# Patient Record
Sex: MALE | Race: WHITE | NOT HISPANIC OR LATINO | Employment: OTHER | URBAN - METROPOLITAN AREA
[De-identification: names, ages, dates, MRNs, and addresses within clinical notes are randomized per-mention and may not be internally consistent; named-entity substitution may affect disease eponyms.]

---

## 2023-04-24 LAB
ALBUMIN (MG/L) IN URINE: 32.6 MG/L
ALBUMIN/CREATININE (UG/MG) IN URINE: 106.2 UG/MG CRT (ref 0–30)
CREATININE (MG/DL) IN URINE: 30.7 MG/DL (ref 20–370)

## 2023-06-01 ENCOUNTER — LAB (OUTPATIENT)
Dept: LAB | Facility: LAB | Age: 73
End: 2023-06-01
Payer: MEDICARE

## 2023-06-01 ENCOUNTER — OFFICE VISIT (OUTPATIENT)
Dept: PRIMARY CARE | Facility: CLINIC | Age: 73
End: 2023-06-01
Payer: MEDICARE

## 2023-06-01 VITALS
SYSTOLIC BLOOD PRESSURE: 130 MMHG | WEIGHT: 201.6 LBS | DIASTOLIC BLOOD PRESSURE: 81 MMHG | BODY MASS INDEX: 27.3 KG/M2 | HEIGHT: 72 IN | HEART RATE: 67 BPM | OXYGEN SATURATION: 97 % | RESPIRATION RATE: 16 BRPM

## 2023-06-01 DIAGNOSIS — R31.0 GROSS HEMATURIA: Primary | ICD-10-CM

## 2023-06-01 DIAGNOSIS — R31.0 GROSS HEMATURIA: ICD-10-CM

## 2023-06-01 LAB
ANION GAP IN SER/PLAS: 14 MMOL/L (ref 10–20)
CALCIUM (MG/DL) IN SER/PLAS: 9.4 MG/DL (ref 8.6–10.6)
CARBON DIOXIDE, TOTAL (MMOL/L) IN SER/PLAS: 28 MMOL/L (ref 21–32)
CHLORIDE (MMOL/L) IN SER/PLAS: 100 MMOL/L (ref 98–107)
CREATININE (MG/DL) IN SER/PLAS: 0.78 MG/DL (ref 0.5–1.3)
GFR MALE: >90 ML/MIN/1.73M2
GLUCOSE (MG/DL) IN SER/PLAS: 127 MG/DL (ref 74–99)
POC APPEARANCE, URINE: ABNORMAL
POC BILIRUBIN, URINE: ABNORMAL
POC BLOOD, URINE: ABNORMAL
POC COLOR, URINE: ABNORMAL
POC GLUCOSE, URINE: ABNORMAL MG/DL
POC KETONES, URINE: NEGATIVE MG/DL
POC LEUKOCYTES, URINE: NEGATIVE
POC NITRITE,URINE: NEGATIVE
POC PH, URINE: 6.5 PH
POC PROTEIN, URINE: ABNORMAL MG/DL
POC SPECIFIC GRAVITY, URINE: 1.02
POC UROBILINOGEN, URINE: 0.2 EU/DL
POTASSIUM (MMOL/L) IN SER/PLAS: 4.2 MMOL/L (ref 3.5–5.3)
SODIUM (MMOL/L) IN SER/PLAS: 138 MMOL/L (ref 136–145)
URATE (MG/DL) IN SER/PLAS: 4.6 MG/DL (ref 4–7.5)
UREA NITROGEN (MG/DL) IN SER/PLAS: 13 MG/DL (ref 6–23)

## 2023-06-01 PROCEDURE — 1159F MED LIST DOCD IN RCRD: CPT | Performed by: FAMILY MEDICINE

## 2023-06-01 PROCEDURE — 3079F DIAST BP 80-89 MM HG: CPT | Performed by: FAMILY MEDICINE

## 2023-06-01 PROCEDURE — 99213 OFFICE O/P EST LOW 20 MIN: CPT | Performed by: FAMILY MEDICINE

## 2023-06-01 PROCEDURE — 84550 ASSAY OF BLOOD/URIC ACID: CPT

## 2023-06-01 PROCEDURE — 3044F HG A1C LEVEL LT 7.0%: CPT | Performed by: FAMILY MEDICINE

## 2023-06-01 PROCEDURE — 1160F RVW MEDS BY RX/DR IN RCRD: CPT | Performed by: FAMILY MEDICINE

## 2023-06-01 PROCEDURE — 3075F SYST BP GE 130 - 139MM HG: CPT | Performed by: FAMILY MEDICINE

## 2023-06-01 PROCEDURE — 1036F TOBACCO NON-USER: CPT | Performed by: FAMILY MEDICINE

## 2023-06-01 PROCEDURE — 80048 BASIC METABOLIC PNL TOTAL CA: CPT

## 2023-06-01 PROCEDURE — 1157F ADVNC CARE PLAN IN RCRD: CPT | Performed by: FAMILY MEDICINE

## 2023-06-01 PROCEDURE — 81002 URINALYSIS NONAUTO W/O SCOPE: CPT | Performed by: FAMILY MEDICINE

## 2023-06-01 PROCEDURE — 87086 URINE CULTURE/COLONY COUNT: CPT

## 2023-06-01 PROCEDURE — 36415 COLL VENOUS BLD VENIPUNCTURE: CPT

## 2023-06-01 NOTE — PATIENT INSTRUCTIONS
Urine culture sent.  Nonfasting labs.  CT urogram.  Recommend adequate hydration.  Urology referral.    F/U 1 month as previously advised: Med refills.

## 2023-06-01 NOTE — PROGRESS NOTES
"Subjective   Patient ID: José Miguel Lua is a 72 y.o. male who presents for Blood in Urine.    HPI  Reports hematuria (red/maroon colored urine) when he got up this morning,  slightly lighter when he urinated a few hours later.  Noticed a \"little dark alma\" in the toilet twice over the past week.  Has urinary frequency and occ urgency at baseline due to Jardiance, but no increase today.  No dysuria, nausea, vomiting, fever.    +Family history of kidney stones (brother) but no personal history.  +Remote history of tobacco use (quit 1983).    Review of Systems   All other systems reviewed and are negative.    Objective   /81   Pulse 67   Resp 16   Ht 1.829 m (6')   Wt 91.4 kg (201 lb 9.6 oz)   SpO2 97%   BMI 27.34 kg/m²     Physical Exam  Constitutional:       General: He is not in acute distress.     Appearance: Normal appearance.   Abdominal:      Tenderness: There is no abdominal tenderness. There is no right CVA tenderness or left CVA tenderness.   Genitourinary:     Comments: Note: Urine is red w/solid strands and sediment.  Skin:     Coloration: Skin is not jaundiced or pale.   Neurological:      Mental Status: He is oriented to person, place, and time.   Psychiatric:         Mood and Affect: Mood normal.         Behavior: Behavior normal.     Assessment/Plan   Diagnoses and all orders for this visit:  Gross hematuria  -     POCT UA (nonautomated) manually resulted  -     Basic Metabolic Panel; Future  -     Uric Acid; Future  -     Urine Culture; Future  -     CT urography w 3D volume rendered imaging; Future  -     Referral to Urology; Future    Urine culture sent.  Nonfasting labs.  CT urogram.  Recommend adequate hydration.  Urology referral.    F/U 1 month as previously advised: Med refills.  "

## 2023-06-02 PROBLEM — I10 ESSENTIAL HYPERTENSION: Status: ACTIVE | Noted: 2019-11-11

## 2023-06-02 PROBLEM — C73 CANCER OF THYROID (MULTI): Status: ACTIVE | Noted: 2023-06-02

## 2023-06-02 PROBLEM — D50.9 IRON DEFICIENCY ANEMIA: Status: ACTIVE | Noted: 2023-06-02

## 2023-06-02 PROBLEM — E78.5 TYPE 2 DIABETES MELLITUS WITH HYPERLIPIDEMIA (MULTI): Status: ACTIVE | Noted: 2019-11-11

## 2023-06-02 PROBLEM — I25.10 ARTERIOSCLEROTIC CORONARY ARTERY DISEASE: Status: ACTIVE | Noted: 2023-06-02

## 2023-06-02 PROBLEM — E11.69 TYPE 2 DIABETES MELLITUS WITH HYPERLIPIDEMIA (MULTI): Status: ACTIVE | Noted: 2019-11-11

## 2023-06-02 PROBLEM — R80.9 MICROALBUMINURIA DUE TO TYPE 2 DIABETES MELLITUS (MULTI): Status: ACTIVE | Noted: 2023-06-02

## 2023-06-02 PROBLEM — E03.9 HYPOTHYROIDISM: Status: ACTIVE | Noted: 2023-06-02

## 2023-06-02 PROBLEM — E11.29 MICROALBUMINURIA DUE TO TYPE 2 DIABETES MELLITUS (MULTI): Status: ACTIVE | Noted: 2023-06-02

## 2023-06-02 PROBLEM — Z95.1 S/P CABG X 3: Status: ACTIVE | Noted: 2023-06-02

## 2023-06-02 PROBLEM — E83.51: Status: ACTIVE | Noted: 2023-06-02

## 2023-06-02 LAB — URINE CULTURE: NORMAL

## 2023-06-02 RX ORDER — SITAGLIPTIN 100 MG/1
1 TABLET, FILM COATED ORAL DAILY
COMMUNITY
Start: 2018-08-20 | End: 2023-07-09 | Stop reason: SDUPTHER

## 2023-06-02 RX ORDER — EMPAGLIFLOZIN 25 MG/1
1 TABLET, FILM COATED ORAL DAILY
COMMUNITY
Start: 2015-10-09 | End: 2023-07-09 | Stop reason: SDUPTHER

## 2023-06-02 RX ORDER — ASPIRIN 81 MG/1
1 TABLET ORAL DAILY
COMMUNITY
Start: 2015-01-21 | End: 2023-07-03 | Stop reason: SDUPTHER

## 2023-06-02 RX ORDER — LEVOTHYROXINE SODIUM 150 UG/1
1 TABLET ORAL DAILY
COMMUNITY
Start: 2013-07-16

## 2023-06-02 RX ORDER — FERROUS SULFATE 325(65) MG
1 TABLET ORAL DAILY
COMMUNITY
Start: 2020-12-20 | End: 2023-07-09 | Stop reason: SDUPTHER

## 2023-06-02 RX ORDER — ATORVASTATIN CALCIUM 80 MG/1
1 TABLET, FILM COATED ORAL DAILY
COMMUNITY
Start: 2019-11-14 | End: 2023-11-06

## 2023-06-02 RX ORDER — METFORMIN HYDROCHLORIDE 1000 MG/1
1 TABLET ORAL 2 TIMES DAILY
COMMUNITY
Start: 2013-07-22 | End: 2023-07-09 | Stop reason: SDUPTHER

## 2023-07-03 PROBLEM — I25.10 ATHSCL HEART DISEASE OF NATIVE CORONARY ARTERY W/O ANG PCTRS: Status: ACTIVE | Noted: 2019-11-11

## 2023-07-03 PROBLEM — N40.1 BPH WITH OBSTRUCTION/LOWER URINARY TRACT SYMPTOMS: Status: ACTIVE | Noted: 2023-07-03

## 2023-07-03 PROBLEM — N13.8 BPH WITH OBSTRUCTION/LOWER URINARY TRACT SYMPTOMS: Status: ACTIVE | Noted: 2023-07-03

## 2023-07-03 PROBLEM — R80.9 PROTEINURIA: Status: ACTIVE | Noted: 2023-07-03

## 2023-07-03 PROBLEM — R31.0 GROSS HEMATURIA: Status: ACTIVE | Noted: 2023-07-03

## 2023-07-03 RX ORDER — FAMOTIDINE 10 MG/1
10 TABLET ORAL DAILY
COMMUNITY

## 2023-07-03 RX ORDER — IBUPROFEN 800 MG/1
800 TABLET ORAL EVERY 6 HOURS PRN
COMMUNITY
Start: 2023-03-31 | End: 2024-01-05 | Stop reason: ALTCHOICE

## 2023-07-03 RX ORDER — NITROGLYCERIN 0.4 MG/1
0.4 TABLET SUBLINGUAL EVERY 5 MIN PRN
COMMUNITY
Start: 2019-10-29

## 2023-07-03 RX ORDER — POTASSIUM CHLORIDE 1500 MG/1
20 TABLET, EXTENDED RELEASE ORAL DAILY
COMMUNITY
Start: 2019-11-14 | End: 2024-01-05 | Stop reason: WASHOUT

## 2023-07-03 RX ORDER — BLOOD-GLUCOSE METER
KIT MISCELLANEOUS
COMMUNITY
Start: 2015-07-28 | End: 2023-09-18 | Stop reason: SDUPTHER

## 2023-07-03 RX ORDER — TADALAFIL 5 MG/1
5 TABLET ORAL DAILY
COMMUNITY
Start: 2023-06-15 | End: 2024-06-04 | Stop reason: SDUPTHER

## 2023-07-03 RX ORDER — METOPROLOL TARTRATE 50 MG/1
50 TABLET ORAL 2 TIMES DAILY
COMMUNITY
Start: 2019-11-14

## 2023-07-03 RX ORDER — LANCETS 28 GAUGE
EACH MISCELLANEOUS
COMMUNITY
Start: 2023-01-15 | End: 2023-09-18 | Stop reason: SDUPTHER

## 2023-07-03 RX ORDER — ACETAMINOPHEN 500 MG
2 TABLET ORAL DAILY
COMMUNITY

## 2023-07-03 RX ORDER — NAPROXEN SODIUM 220 MG/1
81 TABLET, FILM COATED ORAL DAILY
COMMUNITY
Start: 2019-11-14

## 2023-07-03 RX ORDER — CICLOPIROX 80 MG/ML
SOLUTION TOPICAL NIGHTLY
COMMUNITY

## 2023-07-03 RX ORDER — MULTIVITAMIN
1 TABLET ORAL DAILY
COMMUNITY
Start: 2019-11-25

## 2023-07-03 RX ORDER — CETIRIZINE HYDROCHLORIDE 10 MG/1
10 TABLET ORAL DAILY
COMMUNITY
Start: 2020-09-05

## 2023-07-03 RX ORDER — AMOXICILLIN 250 MG
2 CAPSULE ORAL DAILY
COMMUNITY
Start: 2019-11-14 | End: 2024-01-05 | Stop reason: WASHOUT

## 2023-07-03 RX ORDER — HYDROCODONE BITARTRATE AND ACETAMINOPHEN 5; 325 MG/1; MG/1
1 TABLET ORAL EVERY 6 HOURS PRN
COMMUNITY
Start: 2023-03-31 | End: 2024-01-05 | Stop reason: ALTCHOICE

## 2023-07-05 ENCOUNTER — LAB (OUTPATIENT)
Dept: LAB | Facility: LAB | Age: 73
End: 2023-07-05
Payer: MEDICARE

## 2023-07-05 ENCOUNTER — OFFICE VISIT (OUTPATIENT)
Dept: PRIMARY CARE | Facility: CLINIC | Age: 73
End: 2023-07-05
Payer: MEDICARE

## 2023-07-05 VITALS
SYSTOLIC BLOOD PRESSURE: 126 MMHG | WEIGHT: 206 LBS | HEART RATE: 65 BPM | OXYGEN SATURATION: 97 % | DIASTOLIC BLOOD PRESSURE: 76 MMHG | RESPIRATION RATE: 16 BRPM | HEIGHT: 72 IN | BODY MASS INDEX: 27.9 KG/M2

## 2023-07-05 DIAGNOSIS — E11.29 TYPE 2 DIABETES MELLITUS WITH MICROALBUMINURIA, WITHOUT LONG-TERM CURRENT USE OF INSULIN (MULTI): Primary | ICD-10-CM

## 2023-07-05 DIAGNOSIS — E11.29 TYPE 2 DIABETES MELLITUS WITH MICROALBUMINURIA, WITHOUT LONG-TERM CURRENT USE OF INSULIN (MULTI): ICD-10-CM

## 2023-07-05 DIAGNOSIS — R80.9 TYPE 2 DIABETES MELLITUS WITH MICROALBUMINURIA, WITHOUT LONG-TERM CURRENT USE OF INSULIN (MULTI): ICD-10-CM

## 2023-07-05 DIAGNOSIS — R80.9 TYPE 2 DIABETES MELLITUS WITH MICROALBUMINURIA, WITHOUT LONG-TERM CURRENT USE OF INSULIN (MULTI): Primary | ICD-10-CM

## 2023-07-05 DIAGNOSIS — D50.9 IRON DEFICIENCY ANEMIA, UNSPECIFIED IRON DEFICIENCY ANEMIA TYPE: ICD-10-CM

## 2023-07-05 PROBLEM — E78.5 HYPERLIPIDEMIA: Status: ACTIVE | Noted: 2023-07-05

## 2023-07-05 LAB
ERYTHROCYTE DISTRIBUTION WIDTH (RATIO) BY AUTOMATED COUNT: 14 % (ref 11.5–14.5)
ERYTHROCYTE MEAN CORPUSCULAR HEMOGLOBIN CONCENTRATION (G/DL) BY AUTOMATED: 33 G/DL (ref 32–36)
ERYTHROCYTE MEAN CORPUSCULAR VOLUME (FL) BY AUTOMATED COUNT: 100 FL (ref 80–100)
ERYTHROCYTES (10*6/UL) IN BLOOD BY AUTOMATED COUNT: 4.41 X10E12/L (ref 4.5–5.9)
ESTIMATED AVERAGE GLUCOSE FOR HBA1C: 148 MG/DL
HEMATOCRIT (%) IN BLOOD BY AUTOMATED COUNT: 44.2 % (ref 41–52)
HEMOGLOBIN (G/DL) IN BLOOD: 14.6 G/DL (ref 13.5–17.5)
HEMOGLOBIN A1C/HEMOGLOBIN TOTAL IN BLOOD: 6.8 %
LEUKOCYTES (10*3/UL) IN BLOOD BY AUTOMATED COUNT: 7.6 X10E9/L (ref 4.4–11.3)
NRBC (PER 100 WBCS) BY AUTOMATED COUNT: 0 /100 WBC (ref 0–0)
PLATELETS (10*3/UL) IN BLOOD AUTOMATED COUNT: 199 X10E9/L (ref 150–450)

## 2023-07-05 PROCEDURE — 3078F DIAST BP <80 MM HG: CPT | Performed by: FAMILY MEDICINE

## 2023-07-05 PROCEDURE — 1157F ADVNC CARE PLAN IN RCRD: CPT | Performed by: FAMILY MEDICINE

## 2023-07-05 PROCEDURE — 1160F RVW MEDS BY RX/DR IN RCRD: CPT | Performed by: FAMILY MEDICINE

## 2023-07-05 PROCEDURE — 83036 HEMOGLOBIN GLYCOSYLATED A1C: CPT

## 2023-07-05 PROCEDURE — 1159F MED LIST DOCD IN RCRD: CPT | Performed by: FAMILY MEDICINE

## 2023-07-05 PROCEDURE — 85027 COMPLETE CBC AUTOMATED: CPT

## 2023-07-05 PROCEDURE — 99214 OFFICE O/P EST MOD 30 MIN: CPT | Performed by: FAMILY MEDICINE

## 2023-07-05 PROCEDURE — 36415 COLL VENOUS BLD VENIPUNCTURE: CPT

## 2023-07-05 PROCEDURE — 2028F FOOT EXAM PERFORMED: CPT | Performed by: FAMILY MEDICINE

## 2023-07-05 PROCEDURE — 1036F TOBACCO NON-USER: CPT | Performed by: FAMILY MEDICINE

## 2023-07-05 PROCEDURE — 3044F HG A1C LEVEL LT 7.0%: CPT | Performed by: FAMILY MEDICINE

## 2023-07-05 PROCEDURE — 3074F SYST BP LT 130 MM HG: CPT | Performed by: FAMILY MEDICINE

## 2023-07-05 NOTE — PROGRESS NOTES
Subjective   Patient ID: José Miguel Lua is a 72 y.o. male who presents for Med Refill.    HPI  H/O Anemia, DM.  Condition(s) stable.  Taking med(s) as directed.  Requests refills.    Review of Systems  No acute complaints.     Objective   /76   Pulse 65   Resp 16   Ht 1.829 m (6')   Wt 93.4 kg (206 lb)   SpO2 97%   BMI 27.94 kg/m²     Physical Exam  Constitutional:       General: He is not in acute distress.     Appearance: Normal appearance.   Eyes:      Conjunctiva/sclera: Conjunctivae normal.   Cardiovascular:      Rate and Rhythm: Normal rate and regular rhythm.      Heart sounds: Normal heart sounds. No murmur heard.     No friction rub. No gallop.   Pulmonary:      Effort: Pulmonary effort is normal.      Breath sounds: Normal breath sounds. No wheezing, rhonchi or rales.   Neurological:      Mental Status: He is oriented to person, place, and time.   Psychiatric:         Mood and Affect: Mood normal.         Behavior: Behavior normal.     Assessment/Plan   Diagnoses and all orders for this visit:  Type 2 diabetes mellitus with microalbuminuria, without long-term current use of insulin (CMS/[facility])  -     Hemoglobin A1C; Future  Iron deficiency anemia, unspecified iron deficiency anemia type  -     CBC; Future    Nonfasting labs.  Will refill meds after reviewing lab results.    F/U 6 months: Annual wellness visit.

## 2023-07-05 NOTE — PATIENT INSTRUCTIONS
Nonfasting labs.  Will refill meds after reviewing lab results.    F/U 6 months: Annual wellness visit.

## 2023-07-09 DIAGNOSIS — E11.9 TYPE 2 DIABETES MELLITUS WITHOUT COMPLICATION, WITHOUT LONG-TERM CURRENT USE OF INSULIN (MULTI): ICD-10-CM

## 2023-07-09 DIAGNOSIS — D50.9 IRON DEFICIENCY ANEMIA, UNSPECIFIED IRON DEFICIENCY ANEMIA TYPE: Primary | ICD-10-CM

## 2023-07-09 RX ORDER — EMPAGLIFLOZIN 25 MG/1
25 TABLET, FILM COATED ORAL DAILY
Qty: 90 TABLET | Refills: 1 | Status: SHIPPED | OUTPATIENT
Start: 2023-07-09 | End: 2024-01-06 | Stop reason: SDUPTHER

## 2023-07-09 RX ORDER — FERROUS SULFATE 325(65) MG
1 TABLET ORAL DAILY
Qty: 90 TABLET | Refills: 1 | Status: SHIPPED | OUTPATIENT
Start: 2023-07-09 | End: 2024-01-06 | Stop reason: SDUPTHER

## 2023-07-09 RX ORDER — METFORMIN HYDROCHLORIDE 1000 MG/1
1000 TABLET ORAL 2 TIMES DAILY
Qty: 180 TABLET | Refills: 1 | Status: SHIPPED | OUTPATIENT
Start: 2023-07-09 | End: 2024-01-06 | Stop reason: SDUPTHER

## 2023-09-01 LAB
THYROTROPIN (MIU/L) IN SER/PLAS BY DETECTION LIMIT <= 0.05 MIU/L: 3.32 MIU/L (ref 0.44–3.98)
THYROXINE (T4) FREE (NG/DL) IN SER/PLAS: 1.47 NG/DL (ref 0.78–1.48)

## 2023-09-07 LAB
THYROGLOBULIN AB (IU/ML) IN SER/PLAS: <0.9 IU/ML (ref 0–4)
THYROGLOBULIN LC-MS/MS: ABNORMAL NG/ML (ref 1.3–31.8)
THYROGLOBULIN: 0.1 NG/ML (ref 1.3–31.8)

## 2023-09-18 DIAGNOSIS — E78.5 TYPE 2 DIABETES MELLITUS WITH HYPERLIPIDEMIA (MULTI): Primary | ICD-10-CM

## 2023-09-18 DIAGNOSIS — E11.69 TYPE 2 DIABETES MELLITUS WITH HYPERLIPIDEMIA (MULTI): Primary | ICD-10-CM

## 2023-09-18 RX ORDER — BLOOD-GLUCOSE METER
KIT MISCELLANEOUS
Qty: 100 STRIP | Refills: 3 | Status: SHIPPED | OUTPATIENT
Start: 2023-09-18

## 2023-09-18 RX ORDER — LANCETS 28 GAUGE
EACH MISCELLANEOUS
Qty: 100 EACH | Refills: 3 | Status: SHIPPED | OUTPATIENT
Start: 2023-09-18

## 2023-09-21 RX ORDER — CIPROFLOXACIN 500 MG/1
150 TABLET ORAL DAILY
COMMUNITY
Start: 2023-06-15 | End: 2023-06-16

## 2023-10-16 DIAGNOSIS — I25.10 ARTERIOSCLEROTIC CORONARY ARTERY DISEASE: Primary | ICD-10-CM

## 2023-10-27 ENCOUNTER — OFFICE VISIT (OUTPATIENT)
Dept: NEPHROLOGY | Facility: CLINIC | Age: 73
End: 2023-10-27
Payer: MEDICARE

## 2023-10-27 VITALS
DIASTOLIC BLOOD PRESSURE: 80 MMHG | OXYGEN SATURATION: 97 % | WEIGHT: 202.82 LBS | HEART RATE: 68 BPM | BODY MASS INDEX: 27.47 KG/M2 | SYSTOLIC BLOOD PRESSURE: 143 MMHG | HEIGHT: 72 IN

## 2023-10-27 DIAGNOSIS — E78.5 TYPE 2 DIABETES MELLITUS WITH HYPERLIPIDEMIA (MULTI): ICD-10-CM

## 2023-10-27 DIAGNOSIS — R80.9 PROTEINURIA, UNSPECIFIED TYPE: Primary | ICD-10-CM

## 2023-10-27 DIAGNOSIS — E11.69 TYPE 2 DIABETES MELLITUS WITH HYPERLIPIDEMIA (MULTI): ICD-10-CM

## 2023-10-27 PROCEDURE — 3075F SYST BP GE 130 - 139MM HG: CPT | Performed by: NURSE PRACTITIONER

## 2023-10-27 PROCEDURE — 2028F FOOT EXAM PERFORMED: CPT | Performed by: NURSE PRACTITIONER

## 2023-10-27 PROCEDURE — 3044F HG A1C LEVEL LT 7.0%: CPT | Performed by: NURSE PRACTITIONER

## 2023-10-27 PROCEDURE — 1159F MED LIST DOCD IN RCRD: CPT | Performed by: NURSE PRACTITIONER

## 2023-10-27 PROCEDURE — 3079F DIAST BP 80-89 MM HG: CPT | Performed by: NURSE PRACTITIONER

## 2023-10-27 PROCEDURE — 1160F RVW MEDS BY RX/DR IN RCRD: CPT | Performed by: NURSE PRACTITIONER

## 2023-10-27 PROCEDURE — 99213 OFFICE O/P EST LOW 20 MIN: CPT | Performed by: NURSE PRACTITIONER

## 2023-10-27 PROCEDURE — 3066F NEPHROPATHY DOC TX: CPT | Performed by: NURSE PRACTITIONER

## 2023-10-27 PROCEDURE — 1036F TOBACCO NON-USER: CPT | Performed by: NURSE PRACTITIONER

## 2023-10-27 PROCEDURE — 3061F NEG MICROALBUMINURIA REV: CPT | Performed by: NURSE PRACTITIONER

## 2023-10-27 PROCEDURE — 1126F AMNT PAIN NOTED NONE PRSNT: CPT | Performed by: NURSE PRACTITIONER

## 2023-10-27 ASSESSMENT — ENCOUNTER SYMPTOMS
DEPRESSION: 0
LOSS OF SENSATION IN FEET: 0
OCCASIONAL FEELINGS OF UNSTEADINESS: 0

## 2023-10-27 NOTE — PROGRESS NOTES
History Of Present Illness  José Miguel Lua is a 73 y.o. male with medical history significant for DMII, proteinuria, CAD s/p CABGx3, HLD, PEDRO, Vit D deficiency, thyroid cancer s/p total thyroidectomy, hypothyroidism, and COVID in December 2022 who presents for a 6-month fuv for proteinuria.      Past Medical History  As above.    Surgical History  He has a past surgical history that includes Total thyroidectomy (10/22/2013); Other surgical history (05/11/2018); and Other surgical history (12/29/2019).     Social History  He reports that he quit smoking about 40 years ago. His smoking use included cigarettes. He has a 7.00 pack-year smoking history. He has never used smokeless tobacco. No history on file for alcohol use and drug use.    Family History  Family History   Problem Relation Name Age of Onset    Nephrolithiasis Brother          Allergies  Lisinopril    Review of Systems   Constitutional: Negative.    HENT: Negative.     Respiratory: Negative.     Cardiovascular: Negative.    Gastrointestinal: Negative.    Endocrine: Negative.    Genitourinary: Negative.    Musculoskeletal: Negative.    Skin: Negative.    Neurological: Negative.    Hematological: Negative.    Psychiatric/Behavioral: Negative.          Physical Exam  Constitutional:       Appearance: Normal appearance.   HENT:      Head: Normocephalic and atraumatic.      Mouth/Throat:      Mouth: Mucous membranes are moist.   Cardiovascular:      Rate and Rhythm: Normal rate and regular rhythm.      Pulses: Normal pulses.      Heart sounds: Normal heart sounds.   Pulmonary:      Effort: Pulmonary effort is normal.      Breath sounds: Normal breath sounds.   Musculoskeletal:         General: Normal range of motion.   Skin:     General: Skin is warm and dry.   Neurological:      General: No focal deficit present.      Mental Status: He is alert and oriented to person, place, and time.   Psychiatric:         Mood and Affect: Mood normal.         Behavior:  Behavior normal.         Thought Content: Thought content normal.         Judgment: Judgment normal.          Last Recorded Vitals  Blood pressure 143/80, pulse 68, height 1.829 m (6'), weight 92 kg (202 lb 13.2 oz), SpO2 97 %.    Relevant Results  Recent Results (from the past 840 hour(s))   Albumin , Urine Random    Collection Time: 23  2:11 PM   Result Value Ref Range    Albumin, Urine Random 24.9 Not established mg/L    Creatinine, Urine Random 48.0 20.0 - 370.0 mg/dL    Albumin/Creatine Ratio 51.9 (H) <30.0 ug/mg Creat          Assessment/Plan   73-year-old male with medical history significant for DMII, proteinuria, CAD s/p CABGx3, HLD, PEDRO, Vit D deficiency, thyroid cancer s/p total thyroidectomy, hypothyroidism, and COVID in 2022 who presents for a 6-month fuv for microalbuminuria.     # Microalbuminuria: microalbuminuria since at least 2023 - alb/cr ratio 41.0 ug/mg on 23, and 98.7 ug/mg on 23. Serum creatinine level has been WNL. 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     # DMII: dx in . Most recent HgbA1c 6.8% on 23.     Plan:  - Repeat albumin urine spot.  - Defer ACEi at this time.  - Continue to f/u with PCP for optimal DMII management.  - Reviewed strategies for delaying the progression of microalbuminuria includin) Avoidance of NSAIDs including Aleve (Naprosyn), Motrin (Ibuprofen), Mobic (Meloxicam), Celebrex (Celecoxib) and aspirin as well as PPI acid blocking medications such as Prilosec (omeprazole), Protonix (pantoprazole), and Nexium (esomeprazole), as well as other nephrotoxic agents.   2) Avoidance of tobacco or alcohol use.   3) Adequate hydration daily.   4) Blood pressure target 130/80 mmHg.   5) Daily dietary sodium intake less than 2 grams per day.    6) Maintain healthy lifestyle. Healthy diet and regular exercises.   7) Maintain ideal weight.  - FUV: in 6 months or sooner if concerns arise.     Patient  verbalized understanding of above. He will not hesitate to contact Division of Nephrology at [phone] with concerns/questions.     I spent 20 minutes in the professional and overall care of this patient.      Anne-Marie Lew, APRN-CNP

## 2023-11-01 ENCOUNTER — LAB (OUTPATIENT)
Dept: LAB | Facility: LAB | Age: 73
End: 2023-11-01
Payer: MEDICARE

## 2023-11-01 DIAGNOSIS — R80.9 PROTEINURIA, UNSPECIFIED TYPE: ICD-10-CM

## 2023-11-01 PROCEDURE — 82043 UR ALBUMIN QUANTITATIVE: CPT

## 2023-11-01 PROCEDURE — 82570 ASSAY OF URINE CREATININE: CPT

## 2023-11-01 NOTE — PROGRESS NOTES
Chief Complaint:   1 yr follow up     History Of Present Illness:    73-year-old male with HTN, dyslipidemia, non-insulin-dependent DM, CAD with CABG x 3(LIMA-LAD, rad-PLV, DAGMAR-PDA) 11/2019     His coronary disease initially presented with accelerating angina and high risk exercise treadmill test. Extreme exhaustion with minimal activity.      interval events:      No chest pain, dyspnea, palpitations, presyncope or syncope  He continues to exercise by walking several days a week.  Says had some prostatic bleeding which has now resolved.    ECG 11/2023: NSR with HR 64 bpm, RBBB no significant change from prior 1 year ago  ECHO 11/2019: LVEF 65%,, mid and apical inferior wall and mid inferolateral segment  CARDIAC CATH 2019:  Coronary Lesion Summary:  Vessel       Stenosis   Vessel Segment  LAD        80% stenosis    proximal  LAD        90% stenosis proximal to mid  Circumflex 30% stenosis    proximal  RCA        99% stenosis    proximal  RPL        80% stenosis    proximal        Last Recorded Vitals:  Vitals:    11/06/23 1616   BP: 130/80   BP Location: Left arm   Pulse: 66   SpO2: 98%   Weight: 92.1 kg (203 lb)       Past Medical History:  He has a past medical history of Atherosclerotic heart disease of native coronary artery with unspecified angina pectoris (CMS/HCC) (06/23/2021), Atherosclerotic heart disease of native coronary artery with unspecified angina pectoris (CMS/HCC) (06/23/2021), Atherosclerotic heart disease of native coronary artery with unspecified angina pectoris (CMS/HCC) (06/23/2021), COVID-19 (12/31/2022), Encounter for follow-up examination after completed treatment for conditions other than malignant neoplasm (11/25/2019), Encounter for immunization (12/11/2020), Hypocalcemia (12/16/2019), Other conditions influencing health status (07/25/2019), Personal history of diseases of the blood and blood-forming organs and certain disorders involving the immune mechanism (12/11/2020), Personal  history of diseases of the skin and subcutaneous tissue (09/09/2020), Personal history of malignant neoplasm of thyroid (08/25/2017), Personal history of other drug therapy (09/30/2016), Personal history of other specified conditions (10/29/2019), Strain of unspecified muscle, fascia and tendon at shoulder and upper arm level, right arm, initial encounter (11/08/2017), and Toxic effect of venom of bees, accidental (unintentional), initial encounter (09/09/2020).    Past Surgical History:  He has a past surgical history that includes Total thyroidectomy (10/22/2013); Other surgical history (05/11/2018); and Other surgical history (12/29/2019).      Social History:  He reports that he quit smoking about 40 years ago. His smoking use included cigarettes. He has a 7.00 pack-year smoking history. He has never used smokeless tobacco. No history on file for alcohol use and drug use.    Family History:  Family History   Problem Relation Name Age of Onset    Nephrolithiasis Brother          Allergies:  Lisinopril    Outpatient Medications:  Current Outpatient Medications   Medication Instructions    aspirin 81 mg, oral, Daily    atorvastatin (Lipitor) 80 mg tablet 1 tablet, oral, Daily    blood sugar diagnostic (FreeStyle Lite Strips) strip TEST ONCE DAILY    calcium carbonate-vitamin D3 600 mg-20 mcg (800 unit) tablet 2 tablets, oral, Daily    cetirizine (ZYRTEC) 10 mg, oral, Daily    ciclopirox (Penlac) 8 % solution Topical, Nightly    famotidine (PEPCID) 10 mg, oral, Daily    ferrous sulfate 325 mg, oral, Daily    fish oil concentrate (Omega-3) 120-180 mg capsule oral, Daily    FreeStyle Lancets 28 gauge TEST ONCE DAILY    HYDROcodone-acetaminophen (Norco) 5-325 mg tablet 1 tablet, oral, Every 6 hours PRN    ibuprofen 800 mg, oral, Every 6 hours PRN    Jardiance 25 mg, oral, Daily    metFORMIN (GLUCOPHAGE) 1,000 mg, oral, 2 times daily    metoprolol tartrate (LOPRESSOR) 50 mg, oral, 2 times daily    multivitamin (Multiple  Vitamins) tablet 1 tablet, oral, Daily    nitroglycerin (NITROSTAT) 0.4 mg, sublingual, Every 5 min PRN    potassium chloride CR (K-Tab) 20 mEq ER tablet 20 mEq, oral, Daily    sennosides-docusate sodium (Nava-Colace) 8.6-50 mg tablet 2 tablets, oral, Daily    SITagliptin phosphate (JANUVIA) 100 mg, oral, Daily    Synthroid 150 mcg tablet 1 tablet, oral, Daily    tadalafil (CIALIS) 5 mg, oral, Daily       Physical Exam:  GENERAL: alert, cooperative, pleasant, in no acute distress  SKIN: warm, dry, no rash.  NECK: no JVD, no MIKE  CARDIAC: Regular rate and rhythm with no rubs, murmurs, or gallops  CHEST: Normal respiratory efforts, lungs clear to auscultation bilaterally.  ABDOMEN: soft, nontender, nondistended  EXTREMITIES: no edema  NEURO: Alert and oriented x 3.  Grossly normal.  Moves all 4 extremities.       Last Labs:  CBC -  Lab Results   Component Value Date    WBC 7.6 07/05/2023    HGB 14.6 07/05/2023    HCT 44.2 07/05/2023     07/05/2023     07/05/2023       CMP -  Lab Results   Component Value Date    CALCIUM 9.4 06/01/2023    PHOS 1.9 (L) 11/14/2019    PROT 6.9 09/05/2020    ALBUMIN 4.3 09/05/2020    AST 15 01/09/2023    ALT 15 01/09/2023    ALKPHOS 69 09/05/2020    BILITOT 0.6 09/05/2020       LIPID PANEL -   Lab Results   Component Value Date    CHOL 126 01/09/2023    TRIG 108 01/09/2023    HDL 34.4 (A) 01/09/2023    CHHDL 3.7 01/09/2023    LDLF 70 01/09/2023    VLDL 22 01/09/2023       RENAL FUNCTION PANEL -   Lab Results   Component Value Date    GLUCOSE 127 (H) 06/01/2023     06/01/2023    K 4.2 06/01/2023     06/01/2023    CO2 28 06/01/2023    ANIONGAP 14 06/01/2023    BUN 13 06/01/2023    CREATININE 0.78 06/01/2023    GFRMALE >90 06/01/2023    CALCIUM 9.4 06/01/2023    PHOS 1.9 (L) 11/14/2019    ALBUMIN 4.3 09/05/2020        Lab Results   Component Value Date    HGBA1C 6.8 (A) 07/05/2023       Assessment/Plan   73-year-old male with HTN, dyslipidemia, non-insulin-dependent  DM, CAD with CABG x 3(LIMA-LAD, rad-PLV, DAGMAR-PDA) 11/2019 who is doing well over all with no interval cardiac events in the past year. Continue aspirin and statin. lipids well controlled. He has been maintained off of metoprolol due to increased fatigue with this medication. Continues to do well with exercise that is moderate on a regular basis without symptoms. Plan continue current medications and follow-up in about 1 year          Neri Larios, DO

## 2023-11-02 LAB
CREAT UR-MCNC: 48 MG/DL (ref 20–370)
MICROALBUMIN UR-MCNC: 24.9 MG/L
MICROALBUMIN/CREAT UR: 51.9 UG/MG CREAT

## 2023-11-02 NOTE — TELEPHONE ENCOUNTER
Patient compliant with office visits. Upcoming appointment on 11/6/2023. Script cued and forwarded to Dr. Larios to send

## 2023-11-06 ENCOUNTER — OFFICE VISIT (OUTPATIENT)
Dept: CARDIOLOGY | Facility: CLINIC | Age: 73
End: 2023-11-06
Payer: MEDICARE

## 2023-11-06 VITALS
SYSTOLIC BLOOD PRESSURE: 130 MMHG | BODY MASS INDEX: 27.53 KG/M2 | WEIGHT: 203 LBS | HEART RATE: 66 BPM | DIASTOLIC BLOOD PRESSURE: 80 MMHG | OXYGEN SATURATION: 98 %

## 2023-11-06 DIAGNOSIS — I25.10 ARTERIOSCLEROTIC CORONARY ARTERY DISEASE: Primary | ICD-10-CM

## 2023-11-06 DIAGNOSIS — E78.2 MIXED HYPERLIPIDEMIA: ICD-10-CM

## 2023-11-06 DIAGNOSIS — Z95.1 S/P CABG X 3: ICD-10-CM

## 2023-11-06 PROCEDURE — 1159F MED LIST DOCD IN RCRD: CPT | Performed by: INTERNAL MEDICINE

## 2023-11-06 PROCEDURE — 2028F FOOT EXAM PERFORMED: CPT | Performed by: INTERNAL MEDICINE

## 2023-11-06 PROCEDURE — 99214 OFFICE O/P EST MOD 30 MIN: CPT | Mod: PO | Performed by: INTERNAL MEDICINE

## 2023-11-06 PROCEDURE — 3066F NEPHROPATHY DOC TX: CPT | Performed by: INTERNAL MEDICINE

## 2023-11-06 PROCEDURE — 3075F SYST BP GE 130 - 139MM HG: CPT | Performed by: INTERNAL MEDICINE

## 2023-11-06 PROCEDURE — 93005 ELECTROCARDIOGRAM TRACING: CPT | Mod: PO | Performed by: INTERNAL MEDICINE

## 2023-11-06 PROCEDURE — 99214 OFFICE O/P EST MOD 30 MIN: CPT | Performed by: INTERNAL MEDICINE

## 2023-11-06 PROCEDURE — 1160F RVW MEDS BY RX/DR IN RCRD: CPT | Performed by: INTERNAL MEDICINE

## 2023-11-06 PROCEDURE — 1126F AMNT PAIN NOTED NONE PRSNT: CPT | Performed by: INTERNAL MEDICINE

## 2023-11-06 PROCEDURE — 3044F HG A1C LEVEL LT 7.0%: CPT | Performed by: INTERNAL MEDICINE

## 2023-11-06 PROCEDURE — 3079F DIAST BP 80-89 MM HG: CPT | Performed by: INTERNAL MEDICINE

## 2023-11-06 PROCEDURE — 3061F NEG MICROALBUMINURIA REV: CPT | Performed by: INTERNAL MEDICINE

## 2023-11-06 PROCEDURE — 1036F TOBACCO NON-USER: CPT | Performed by: INTERNAL MEDICINE

## 2023-11-06 RX ORDER — ATORVASTATIN CALCIUM 80 MG/1
80 TABLET, FILM COATED ORAL NIGHTLY
Qty: 90 TABLET | Refills: 3 | Status: SHIPPED | OUTPATIENT
Start: 2023-11-06

## 2023-11-11 ASSESSMENT — ENCOUNTER SYMPTOMS
HEMATOLOGIC/LYMPHATIC NEGATIVE: 1
CONSTITUTIONAL NEGATIVE: 1
MUSCULOSKELETAL NEGATIVE: 1
ENDOCRINE NEGATIVE: 1
GASTROINTESTINAL NEGATIVE: 1
CARDIOVASCULAR NEGATIVE: 1
NEUROLOGICAL NEGATIVE: 1
RESPIRATORY NEGATIVE: 1
PSYCHIATRIC NEGATIVE: 1

## 2023-11-20 LAB
ATRIAL RATE: 64 BPM
P AXIS: 36 DEGREES
P OFFSET: 200 MS
P ONSET: 157 MS
PR INTERVAL: 124 MS
Q ONSET: 219 MS
QRS COUNT: 10 BEATS
QRS DURATION: 136 MS
QT INTERVAL: 420 MS
QTC CALCULATION(BAZETT): 433 MS
QTC FREDERICIA: 429 MS
R AXIS: 74 DEGREES
T AXIS: 27 DEGREES
T OFFSET: 429 MS
VENTRICULAR RATE: 64 BPM

## 2023-12-14 NOTE — PROGRESS NOTES
FUV    Last seen - 6/15/23     HISTORY OF PRESENT ILLNESS:   José Miguel Lua is a 73 y.o. male who is being seen today for FUV    Hx:  -gross hematuria  -CT urogram 6/9/23 unremarkable  -negative cystoscopy  -urinary urgency and NTF on daily dosing Cialis     PAST MEDICAL HISTORY:  Past Medical History:   Diagnosis Date    Atherosclerotic heart disease of native coronary artery with unspecified angina pectoris (CMS/HCC) 06/23/2021    Coronary artery disease involving native coronary artery of native heart with angina pectoris    Atherosclerotic heart disease of native coronary artery with unspecified angina pectoris (CMS/HCC) 06/23/2021    Coronary artery disease involving native coronary artery of native heart with angina pectoris    Atherosclerotic heart disease of native coronary artery with unspecified angina pectoris (CMS/HCC) 06/23/2021    Coronary artery disease involving native coronary artery of native heart with angina pectoris    COVID-19 12/31/2022    COVID-19    Encounter for follow-up examination after completed treatment for conditions other than malignant neoplasm 11/25/2019    Hospital discharge follow-up    Encounter for immunization 12/11/2020    Encounter for immunization    Hypocalcemia 12/16/2019    Hypocalcemia    Other conditions influencing health status 07/25/2019    History of cough    Personal history of diseases of the blood and blood-forming organs and certain disorders involving the immune mechanism 12/11/2020    History of anemia    Personal history of diseases of the skin and subcutaneous tissue 09/09/2020    History of cellulitis    Personal history of malignant neoplasm of thyroid 08/25/2017    History of malignant neoplasm of thyroid    Personal history of other drug therapy 09/30/2016    History of influenza vaccination    Personal history of other specified conditions 10/29/2019    History of exertional chest pain    Strain of unspecified muscle, fascia and tendon at shoulder  and upper arm level, right arm, initial encounter 11/08/2017    Right shoulder strain    Toxic effect of venom of bees, accidental (unintentional), initial encounter 09/09/2020    Bee sting       PAST SURGICAL HISTORY:  Past Surgical History:   Procedure Laterality Date    OTHER SURGICAL HISTORY  05/11/2018    Parathyroid Surgery    OTHER SURGICAL HISTORY  12/29/2019    Coronary artery bypass graft    TOTAL THYROIDECTOMY  10/22/2013    Thyroid Surgery Total Thyroidectomy        ALLERGIES:   Allergies   Allergen Reactions    Lisinopril Cough        MEDICATIONS:   Current Outpatient Medications   Medication Instructions    aspirin 81 mg, oral, Daily    atorvastatin (LIPITOR) 80 mg, oral, Nightly    blood sugar diagnostic (FreeStyle Lite Strips) strip TEST ONCE DAILY    calcium carbonate-vitamin D3 600 mg-20 mcg (800 unit) tablet 2 tablets, oral, Daily    cetirizine (ZYRTEC) 10 mg, oral, Daily    ciclopirox (Penlac) 8 % solution Topical, Nightly    famotidine (PEPCID) 10 mg, oral, Daily    ferrous sulfate (325 mg ferrous sulfate) 325 mg, oral, Daily    fish oil concentrate (Omega-3) 120-180 mg capsule oral, Daily    FreeStyle Lancets 28 gauge TEST ONCE DAILY    HYDROcodone-acetaminophen (Norco) 5-325 mg tablet 1 tablet, oral, Every 6 hours PRN    ibuprofen 800 mg, oral, Every 6 hours PRN    Jardiance 25 mg, oral, Daily    metFORMIN (GLUCOPHAGE) 1,000 mg, oral, 2 times daily    metoprolol tartrate (LOPRESSOR) 50 mg, oral, 2 times daily    multivitamin (Multiple Vitamins) tablet 1 tablet, oral, Daily    nitroglycerin (NITROSTAT) 0.4 mg, sublingual, Every 5 min PRN    potassium chloride CR (K-Tab) 20 mEq ER tablet 20 mEq, oral, Daily    sennosides-docusate sodium (Nava-Colace) 8.6-50 mg tablet 2 tablets, oral, Daily    SITagliptin phosphate (JANUVIA) 100 mg, oral, Daily    Synthroid 150 mcg tablet 1 tablet, oral, Daily    tadalafil (CIALIS) 5 mg, oral, Daily        PHYSICAL EXAM:  There were no vitals taken for this  "visit.  Constitutional: Patient appears well-developed and well-nourished. No distress.    Pulmonary/Chest: Effort normal. No respiratory distress.   Abdominal: Soft, ND NT  : WNL  Musculoskeletal: Normal range of motion.    Neurological: Alert and oriented to person, place, and time.  Psychiatric: Normal mood and affect. Behavior is normal. Thought content normal.      Labs:  No results found for: \"TESTOSTERONE\"  No results found for: \"PSA\"  No components found for: \"CBC\"  Lab Results   Component Value Date    CREATININE 0.78 06/01/2023     No components found for: \"TESTOTMS\"  No results found for: \"TESTF\"    Imaging:    Discussion: Pt denies any new occurrence hematuria. No urinary complaints at this time. Denies hematuria, dysuria, nocturia, flank pain, and bothersome frequency or urgency. Denies pushing or straining to void and states he feels he empties his bladder when voiding. Notes that daily Cialis is helping significantly with sx. Will get PVR today and check urine sample for microscopic hematuria. Follow up in 1 year to re-assess sx.   AUA:13, KEVON:25    Assessment:    No diagnosis found.    José Miguel Lua is a 73 y.o. male here for FUV     Plan:   1) PVR and urine sample today  2) Follow up in 1 year to re-assess sx.  All questions and concerns were addressed. Patient verbalizes understanding and has no other questions at this time.     Scribe Attestation  By signing my name below, IKaterina , Scribe   attest that this documentation has been prepared under the direction and in the presence of Ulisses Ventura MD.      "

## 2023-12-15 ENCOUNTER — OFFICE VISIT (OUTPATIENT)
Dept: UROLOGY | Facility: HOSPITAL | Age: 73
End: 2023-12-15
Payer: MEDICARE

## 2023-12-15 DIAGNOSIS — R31.0 GROSS HEMATURIA: Primary | ICD-10-CM

## 2023-12-15 LAB
APPEARANCE UR: CLEAR
BILIRUB UR STRIP.AUTO-MCNC: NEGATIVE MG/DL
COLOR UR: YELLOW
GLUCOSE UR STRIP.AUTO-MCNC: ABNORMAL MG/DL
KETONES UR STRIP.AUTO-MCNC: NEGATIVE MG/DL
LEUKOCYTE ESTERASE UR QL STRIP.AUTO: NEGATIVE
NITRITE UR QL STRIP.AUTO: NEGATIVE
PH UR STRIP.AUTO: 6 [PH]
PROT UR STRIP.AUTO-MCNC: NEGATIVE MG/DL
RBC # UR STRIP.AUTO: NEGATIVE /UL
SP GR UR STRIP.AUTO: 1.03
UROBILINOGEN UR STRIP.AUTO-MCNC: <2 MG/DL

## 2023-12-15 PROCEDURE — 3066F NEPHROPATHY DOC TX: CPT | Performed by: UROLOGY

## 2023-12-15 PROCEDURE — 1036F TOBACCO NON-USER: CPT | Performed by: UROLOGY

## 2023-12-15 PROCEDURE — 99213 OFFICE O/P EST LOW 20 MIN: CPT | Performed by: UROLOGY

## 2023-12-15 PROCEDURE — 81003 URINALYSIS AUTO W/O SCOPE: CPT | Performed by: UROLOGY

## 2023-12-15 PROCEDURE — 1159F MED LIST DOCD IN RCRD: CPT | Performed by: UROLOGY

## 2023-12-15 PROCEDURE — 3061F NEG MICROALBUMINURIA REV: CPT | Performed by: UROLOGY

## 2023-12-15 PROCEDURE — 2028F FOOT EXAM PERFORMED: CPT | Performed by: UROLOGY

## 2023-12-15 PROCEDURE — 1126F AMNT PAIN NOTED NONE PRSNT: CPT | Performed by: UROLOGY

## 2023-12-15 PROCEDURE — 1160F RVW MEDS BY RX/DR IN RCRD: CPT | Performed by: UROLOGY

## 2023-12-15 PROCEDURE — 3044F HG A1C LEVEL LT 7.0%: CPT | Performed by: UROLOGY

## 2024-01-04 ENCOUNTER — OFFICE VISIT (OUTPATIENT)
Dept: UROLOGY | Facility: HOSPITAL | Age: 74
End: 2024-01-04
Payer: MEDICARE

## 2024-01-04 DIAGNOSIS — N40.1 BPH WITH OBSTRUCTION/LOWER URINARY TRACT SYMPTOMS: Primary | ICD-10-CM

## 2024-01-04 DIAGNOSIS — N13.8 BPH WITH OBSTRUCTION/LOWER URINARY TRACT SYMPTOMS: Primary | ICD-10-CM

## 2024-01-04 DIAGNOSIS — R33.9 INCOMPLETE BLADDER EMPTYING: ICD-10-CM

## 2024-01-04 PROCEDURE — 99214 OFFICE O/P EST MOD 30 MIN: CPT | Performed by: UROLOGY

## 2024-01-04 PROCEDURE — 1159F MED LIST DOCD IN RCRD: CPT | Performed by: UROLOGY

## 2024-01-04 PROCEDURE — 1126F AMNT PAIN NOTED NONE PRSNT: CPT | Performed by: UROLOGY

## 2024-01-04 PROCEDURE — 1160F RVW MEDS BY RX/DR IN RCRD: CPT | Performed by: UROLOGY

## 2024-01-04 PROCEDURE — 1036F TOBACCO NON-USER: CPT | Performed by: UROLOGY

## 2024-01-04 PROCEDURE — 3066F NEPHROPATHY DOC TX: CPT | Performed by: UROLOGY

## 2024-01-04 PROCEDURE — 99204 OFFICE O/P NEW MOD 45 MIN: CPT | Performed by: UROLOGY

## 2024-01-04 NOTE — PROGRESS NOTES
HPI  Note per Dr. Ventura 12/15/23:  José Miguel Lua is a 73 y.o. male who is being seen today for FUV     Hx:  -gross hematuria  -CT urogram 6/9/23 unremarkable  -negative cystoscopy  -urinary urgency and NTF on daily dosing Cialis   ----------------------------------------------------------------------------  1/4/24 - here today to discuss outlet procedures due to hx of urinary symptoms. He has some retention along with some nocturia, not as bothersome or troublesome at this time. No red flag symptoms. No UTIs, hematuruia, stones. PVR last mo with Dr. Ventura 193cc    Current Medications:  Current Outpatient Medications   Medication Sig Dispense Refill    aspirin 81 mg chewable tablet Chew 1 tablet (81 mg) once daily.      atorvastatin (Lipitor) 80 mg tablet TAKE 1 TABLET DAILY AT BEDTIME 90 tablet 3    blood sugar diagnostic (FreeStyle Lite Strips) strip TEST ONCE DAILY 100 strip 3    calcium carbonate-vitamin D3 600 mg-20 mcg (800 unit) tablet Take 2 tablets by mouth once daily.      cetirizine (ZyrTEC) 10 mg tablet Take 1 tablet (10 mg) by mouth once daily.      ciclopirox (Penlac) 8 % solution Apply topically once daily at bedtime.      famotidine (Pepcid) 10 mg tablet Take 1 tablet (10 mg) by mouth once daily.      ferrous sulfate 325 (65 Fe) MG tablet Take 1 tablet (325 mg) by mouth once daily. 90 tablet 1    fish oil concentrate (Omega-3) 120-180 mg capsule Take by mouth once daily.      FreeStyle Lancets 28 gauge TEST ONCE DAILY 100 each 3    HYDROcodone-acetaminophen (Norco) 5-325 mg tablet Take 1 tablet by mouth every 6 hours if needed.      ibuprofen 800 mg tablet Take 1 tablet (800 mg) by mouth every 6 hours if needed.      Jardiance 25 mg Take 1 tablet (25 mg) by mouth once daily. 90 tablet 1    metFORMIN (Glucophage) 1,000 mg tablet Take 1 tablet (1,000 mg) by mouth 2 times a day. 180 tablet 1    metoprolol tartrate (Lopressor) 50 mg tablet Take 1 tablet by mouth 2 times a day.      multivitamin  (Multiple Vitamins) tablet Take 1 tablet by mouth once daily.      nitroglycerin (Nitrostat) 0.4 mg SL tablet Place 1 tablet (0.4 mg) under the tongue every 5 minutes if needed.      potassium chloride CR (K-Tab) 20 mEq ER tablet Take 1 tablet (20 mEq) by mouth once daily.      sennosides-docusate sodium (Nava-Colace) 8.6-50 mg tablet Take 2 tablets by mouth once daily.      SITagliptin phosphate (Januvia) 100 mg tablet Take 1 tablet (100 mg) by mouth once daily. 90 tablet 1    Synthroid 150 mcg tablet Take 1 tablet (150 mcg) by mouth once daily.      tadalafil (Cialis) 5 mg tablet Take 1 tablet (5 mg) by mouth once daily.       No current facility-administered medications for this visit.        Active Problems:  José Miguel Lua is a 73 y.o. male with the following Problems and Medications.  Patient Active Problem List   Diagnosis    Arteriosclerotic coronary artery disease    Cancer of thyroid (CMS/[facility])    Essential hypertension    Hypothyroidism    Iron deficiency anemia    Microalbuminuria due to type 2 diabetes mellitus (CMS/[facility])    S/P CABG x 3    Serum calcium decreased    Type 2 diabetes mellitus with hyperlipidemia (CMS/[facility])    Athscl heart disease of native coronary artery w/o ang pctrs    Proteinuria    Gross hematuria    BPH with obstruction/lower urinary tract symptoms    Hyperlipidemia     Current Outpatient Medications   Medication Sig Dispense Refill    aspirin 81 mg chewable tablet Chew 1 tablet (81 mg) once daily.      atorvastatin (Lipitor) 80 mg tablet TAKE 1 TABLET DAILY AT BEDTIME 90 tablet 3    blood sugar diagnostic (FreeStyle Lite Strips) strip TEST ONCE DAILY 100 strip 3    calcium carbonate-vitamin D3 600 mg-20 mcg (800 unit) tablet Take 2 tablets by mouth once daily.      cetirizine (ZyrTEC) 10 mg tablet Take 1 tablet (10 mg) by mouth once daily.      ciclopirox (Penlac) 8 % solution Apply topically once daily at bedtime.      famotidine (Pepcid) 10 mg tablet Take 1 tablet (10 mg) by  mouth once daily.      ferrous sulfate 325 (65 Fe) MG tablet Take 1 tablet (325 mg) by mouth once daily. 90 tablet 1    fish oil concentrate (Omega-3) 120-180 mg capsule Take by mouth once daily.      FreeStyle Lancets 28 gauge TEST ONCE DAILY 100 each 3    HYDROcodone-acetaminophen (Norco) 5-325 mg tablet Take 1 tablet by mouth every 6 hours if needed.      ibuprofen 800 mg tablet Take 1 tablet (800 mg) by mouth every 6 hours if needed.      Jardiance 25 mg Take 1 tablet (25 mg) by mouth once daily. 90 tablet 1    metFORMIN (Glucophage) 1,000 mg tablet Take 1 tablet (1,000 mg) by mouth 2 times a day. 180 tablet 1    metoprolol tartrate (Lopressor) 50 mg tablet Take 1 tablet by mouth 2 times a day.      multivitamin (Multiple Vitamins) tablet Take 1 tablet by mouth once daily.      nitroglycerin (Nitrostat) 0.4 mg SL tablet Place 1 tablet (0.4 mg) under the tongue every 5 minutes if needed.      potassium chloride CR (K-Tab) 20 mEq ER tablet Take 1 tablet (20 mEq) by mouth once daily.      sennosides-docusate sodium (Nava-Colace) 8.6-50 mg tablet Take 2 tablets by mouth once daily.      SITagliptin phosphate (Januvia) 100 mg tablet Take 1 tablet (100 mg) by mouth once daily. 90 tablet 1    Synthroid 150 mcg tablet Take 1 tablet (150 mcg) by mouth once daily.      tadalafil (Cialis) 5 mg tablet Take 1 tablet (5 mg) by mouth once daily.       No current facility-administered medications for this visit.       PMH:  Past Medical History:   Diagnosis Date    Atherosclerotic heart disease of native coronary artery with unspecified angina pectoris (CMS/HCC) 06/23/2021    Coronary artery disease involving native coronary artery of native heart with angina pectoris    Atherosclerotic heart disease of native coronary artery with unspecified angina pectoris (CMS/HCC) 06/23/2021    Coronary artery disease involving native coronary artery of native heart with angina pectoris    Atherosclerotic heart disease of native coronary  artery with unspecified angina pectoris (CMS/HCC) 2021    Coronary artery disease involving native coronary artery of native heart with angina pectoris    COVID-19 2022    COVID-19    Encounter for follow-up examination after completed treatment for conditions other than malignant neoplasm 2019    Hospital discharge follow-up    Encounter for immunization 2020    Encounter for immunization    Hypocalcemia 2019    Hypocalcemia    Other conditions influencing health status 2019    History of cough    Personal history of diseases of the blood and blood-forming organs and certain disorders involving the immune mechanism 2020    History of anemia    Personal history of diseases of the skin and subcutaneous tissue 2020    History of cellulitis    Personal history of malignant neoplasm of thyroid 2017    History of malignant neoplasm of thyroid    Personal history of other drug therapy 2016    History of influenza vaccination    Personal history of other specified conditions 10/29/2019    History of exertional chest pain    Strain of unspecified muscle, fascia and tendon at shoulder and upper arm level, right arm, initial encounter 2017    Right shoulder strain    Toxic effect of venom of bees, accidental (unintentional), initial encounter 2020    Bee sting       PSH:  Past Surgical History:   Procedure Laterality Date    OTHER SURGICAL HISTORY  2018    Parathyroid Surgery    OTHER SURGICAL HISTORY  2019    Coronary artery bypass graft    TOTAL THYROIDECTOMY  10/22/2013    Thyroid Surgery Total Thyroidectomy       FMH:  Family History   Problem Relation Name Age of Onset    Nephrolithiasis Brother         SHx:  Social History     Tobacco Use    Smoking status: Former     Packs/day: 0.50     Years: 14.00     Additional pack years: 0.00     Total pack years: 7.00     Types: Cigarettes     Quit date:      Years since quittin.0     Smokeless tobacco: Never       Allergies:  Allergies   Allergen Reactions    Lisinopril Cough     Assessment/Plan  Patient has hx of BPH with LUTS including gross hematuria, urgency, frequency and borderline urinary retention. He is not completely bothered by his symptoms at this time. He is currently on daily Cialis 5mg.    We discussed etiologies of bothersome LUTS in men of advancing age. We discussed that these symptoms are most commonly secondary to an enlarged prostate. Less commonly, men can have an independent overactive bladder or other issues driving their bothersome LUTS.    I drew diagrams to help illustrate the mechanism of prostatic obstruction and bothersome LUTS. As the prostate obstructs the outflow of urine from the bladder, this can result in some combination of obstructive and irritative urinary symptoms. We discussed obstructive vs irritative symptoms in detail. We discussed the various with levels of severity and possible treatments depending on how bothersome the symptoms are as well as presence or absence of red flag symptoms including bleeding, infections, or retention.     We discussed trying  a different medication to relax the prostate vs medications to calm the bladder vs procedural interventions. Discussed medications as a reasonable first line treatment in the absence of red flag symptoms.    For now, the patient would like to continue daily Cialis 5mg. If no better with medication, we discussed the need for a procedure in the future due to his bladder showing evidence of being effected by his enlarged prostate. If symptoms worsen, we will revisit treatments available at that time.     For now, we will follow up in 6 months for a symptom check, or sooner prn.    Scribe Attestation  By signing my name below, IMarti , Scribkavitha   attest that this documentation has been prepared under the direction and in the presence of Cole Multani MD.

## 2024-01-05 ENCOUNTER — OFFICE VISIT (OUTPATIENT)
Dept: PRIMARY CARE | Facility: CLINIC | Age: 74
End: 2024-01-05
Payer: MEDICARE

## 2024-01-05 ENCOUNTER — LAB (OUTPATIENT)
Dept: LAB | Facility: LAB | Age: 74
End: 2024-01-05
Payer: MEDICARE

## 2024-01-05 VITALS
DIASTOLIC BLOOD PRESSURE: 62 MMHG | HEART RATE: 60 BPM | TEMPERATURE: 97.5 F | RESPIRATION RATE: 16 BRPM | HEIGHT: 72 IN | WEIGHT: 197.8 LBS | BODY MASS INDEX: 26.79 KG/M2 | OXYGEN SATURATION: 98 % | SYSTOLIC BLOOD PRESSURE: 126 MMHG

## 2024-01-05 DIAGNOSIS — R80.9 TYPE 2 DIABETES MELLITUS WITH MICROALBUMINURIA, WITHOUT LONG-TERM CURRENT USE OF INSULIN (MULTI): ICD-10-CM

## 2024-01-05 DIAGNOSIS — Z00.00 MEDICARE ANNUAL WELLNESS VISIT, SUBSEQUENT: Primary | ICD-10-CM

## 2024-01-05 DIAGNOSIS — E78.5 HYPERLIPIDEMIA, UNSPECIFIED HYPERLIPIDEMIA TYPE: ICD-10-CM

## 2024-01-05 DIAGNOSIS — I10 ESSENTIAL HYPERTENSION: ICD-10-CM

## 2024-01-05 DIAGNOSIS — I25.10 ARTERIOSCLEROTIC CORONARY ARTERY DISEASE: ICD-10-CM

## 2024-01-05 DIAGNOSIS — E11.29 TYPE 2 DIABETES MELLITUS WITH MICROALBUMINURIA, WITHOUT LONG-TERM CURRENT USE OF INSULIN (MULTI): ICD-10-CM

## 2024-01-05 DIAGNOSIS — D50.9 IRON DEFICIENCY ANEMIA, UNSPECIFIED IRON DEFICIENCY ANEMIA TYPE: ICD-10-CM

## 2024-01-05 PROBLEM — E83.51: Status: RESOLVED | Noted: 2023-06-02 | Resolved: 2024-01-05

## 2024-01-05 PROBLEM — R31.0 GROSS HEMATURIA: Status: RESOLVED | Noted: 2023-07-03 | Resolved: 2024-01-05

## 2024-01-05 LAB
ALT SERPL W P-5'-P-CCNC: 21 U/L (ref 10–52)
ANION GAP SERPL CALC-SCNC: 14 MMOL/L (ref 10–20)
AST SERPL W P-5'-P-CCNC: 19 U/L (ref 9–39)
BUN SERPL-MCNC: 16 MG/DL (ref 6–23)
CALCIUM SERPL-MCNC: 9.3 MG/DL (ref 8.6–10.6)
CHLORIDE SERPL-SCNC: 102 MMOL/L (ref 98–107)
CHOLEST SERPL-MCNC: 123 MG/DL (ref 0–199)
CHOLESTEROL/HDL RATIO: 3.6
CO2 SERPL-SCNC: 30 MMOL/L (ref 21–32)
CREAT SERPL-MCNC: 0.67 MG/DL (ref 0.5–1.3)
ERYTHROCYTE [DISTWIDTH] IN BLOOD BY AUTOMATED COUNT: 13.3 % (ref 11.5–14.5)
EST. AVERAGE GLUCOSE BLD GHB EST-MCNC: 146 MG/DL
GFR SERPL CREATININE-BSD FRML MDRD: >90 ML/MIN/1.73M*2
GLUCOSE SERPL-MCNC: 126 MG/DL (ref 74–99)
HBA1C MFR BLD: 6.7 %
HCT VFR BLD AUTO: 46.1 % (ref 41–52)
HDLC SERPL-MCNC: 34.1 MG/DL
HGB BLD-MCNC: 15.4 G/DL (ref 13.5–17.5)
IRON SATN MFR SERPL: 28 % (ref 25–45)
IRON SERPL-MCNC: 84 UG/DL (ref 35–150)
LDLC SERPL CALC-MCNC: 70 MG/DL
MCH RBC QN AUTO: 32.4 PG (ref 26–34)
MCHC RBC AUTO-ENTMCNC: 33.4 G/DL (ref 32–36)
MCV RBC AUTO: 97 FL (ref 80–100)
NON HDL CHOLESTEROL: 89 MG/DL (ref 0–149)
NRBC BLD-RTO: 0 /100 WBCS (ref 0–0)
PLATELET # BLD AUTO: 243 X10*3/UL (ref 150–450)
POTASSIUM SERPL-SCNC: 4.3 MMOL/L (ref 3.5–5.3)
RBC # BLD AUTO: 4.75 X10*6/UL (ref 4.5–5.9)
SODIUM SERPL-SCNC: 142 MMOL/L (ref 136–145)
TIBC SERPL-MCNC: 299 UG/DL (ref 240–445)
TRIGL SERPL-MCNC: 96 MG/DL (ref 0–149)
UIBC SERPL-MCNC: 215 UG/DL (ref 110–370)
VIT B12 SERPL-MCNC: 451 PG/ML (ref 211–911)
VLDL: 19 MG/DL (ref 0–40)
WBC # BLD AUTO: 7.2 X10*3/UL (ref 4.4–11.3)

## 2024-01-05 PROCEDURE — 1170F FXNL STATUS ASSESSED: CPT | Performed by: FAMILY MEDICINE

## 2024-01-05 PROCEDURE — 1036F TOBACCO NON-USER: CPT | Performed by: FAMILY MEDICINE

## 2024-01-05 PROCEDURE — 83036 HEMOGLOBIN GLYCOSYLATED A1C: CPT

## 2024-01-05 PROCEDURE — 1159F MED LIST DOCD IN RCRD: CPT | Performed by: FAMILY MEDICINE

## 2024-01-05 PROCEDURE — 83540 ASSAY OF IRON: CPT

## 2024-01-05 PROCEDURE — 84450 TRANSFERASE (AST) (SGOT): CPT

## 2024-01-05 PROCEDURE — 1160F RVW MEDS BY RX/DR IN RCRD: CPT | Performed by: FAMILY MEDICINE

## 2024-01-05 PROCEDURE — 3078F DIAST BP <80 MM HG: CPT | Performed by: FAMILY MEDICINE

## 2024-01-05 PROCEDURE — 3074F SYST BP LT 130 MM HG: CPT | Performed by: FAMILY MEDICINE

## 2024-01-05 PROCEDURE — 99214 OFFICE O/P EST MOD 30 MIN: CPT | Performed by: FAMILY MEDICINE

## 2024-01-05 PROCEDURE — 1126F AMNT PAIN NOTED NONE PRSNT: CPT | Performed by: FAMILY MEDICINE

## 2024-01-05 PROCEDURE — 80061 LIPID PANEL: CPT

## 2024-01-05 PROCEDURE — 83550 IRON BINDING TEST: CPT

## 2024-01-05 PROCEDURE — 3044F HG A1C LEVEL LT 7.0%: CPT | Performed by: FAMILY MEDICINE

## 2024-01-05 PROCEDURE — 36415 COLL VENOUS BLD VENIPUNCTURE: CPT

## 2024-01-05 PROCEDURE — 82607 VITAMIN B-12: CPT

## 2024-01-05 PROCEDURE — 80048 BASIC METABOLIC PNL TOTAL CA: CPT

## 2024-01-05 PROCEDURE — G0439 PPPS, SUBSEQ VISIT: HCPCS | Performed by: FAMILY MEDICINE

## 2024-01-05 PROCEDURE — 3066F NEPHROPATHY DOC TX: CPT | Performed by: FAMILY MEDICINE

## 2024-01-05 PROCEDURE — 3048F LDL-C <100 MG/DL: CPT | Performed by: FAMILY MEDICINE

## 2024-01-05 PROCEDURE — 84460 ALANINE AMINO (ALT) (SGPT): CPT

## 2024-01-05 PROCEDURE — 85027 COMPLETE CBC AUTOMATED: CPT

## 2024-01-05 ASSESSMENT — PATIENT HEALTH QUESTIONNAIRE - PHQ9
SUM OF ALL RESPONSES TO PHQ9 QUESTIONS 1 AND 2: 0
1. LITTLE INTEREST OR PLEASURE IN DOING THINGS: NOT AT ALL
2. FEELING DOWN, DEPRESSED OR HOPELESS: NOT AT ALL

## 2024-01-05 ASSESSMENT — ACTIVITIES OF DAILY LIVING (ADL)
MANAGING_FINANCES: INDEPENDENT
BATHING: INDEPENDENT
GROCERY_SHOPPING: INDEPENDENT
DOING_HOUSEWORK: INDEPENDENT
DRESSING: INDEPENDENT
TAKING_MEDICATION: INDEPENDENT

## 2024-01-05 NOTE — PATIENT INSTRUCTIONS
Risk factors identified during visit:   BMI<18.5 or >25: Recommend weight loss efforts (see [email or web address]/category/nutrition for ideas)  BMI<18.5 or >25: Patient declines nutrition referrals    Flu shot: Up to date.  PPSV23: Up to date.  PCV13: Up to date.  PCV20: N/A.  Shingrix: Up to date.  Colon cancer screening: Up to date (2022, repeat in 5 years).  AAA screening: Up to date.  HIV screening: N/A.  Hepatitis C screening: Up to date.  Prostate cancer screening: N/A.    Fasting labs.  Will refill meds after reviewing lab results.  Ophthalmology (eye exam) and podiatry (foot exam) referrals.    F/U 6 months: Med refills.

## 2024-01-05 NOTE — PROGRESS NOTES
Subjective   Reason for Visit: José Miguel Lua is an 73 y.o. male here for a Medicare Wellness visit.     Past Medical, Surgical, and Family History reviewed and updated in chart.    Reviewed all medications by prescribing practitioner or clinical pharmacist (such as prescriptions, OTCs, herbal therapies and supplements) and documented in the medical record.    HPI    Patient Self Assessment of Health Status  Patient Self Assessment: Good    Nutrition and Exercise  Current Diet: Well Balanced Diet  Adequate Fluid Intake: No  Caffeine: Yes  Exercise Frequency: Infrequently    Functional Ability/Level of Safety  Cognitive Impairment Observed: No cognitive impairment observed  Cognitive Impairment Reported: No cognitive impairment reported by patient or family    Home Safety Risk Factors: None    H/O Anemia, DM.  Condition(s) stable.  Taking med(s) as directed.  Requests refills.    Medicare Wellness Billing Compliance Satisfied    *This is a visual tool to show completion of required items on the day of the visit. Green checks will only appear on the date of visit.    Review all medications by prescribing practitioner or clinical pharmacist (such as prescriptions, OTCs, herbal therapies and supplements) documented in the medical record    Past Medical, Surgical, and Family History reviewed and updated in chart    Tobacco Use Reviewed    Alcohol Use Reviewed    Illicit Drug Use Reviewed    PHQ2/9    Falls in Last Year Reviewed    Home Safety Risk Factors Reviewed    Cognitive Impairment Reviewed    Patient Self Assessment and Health Status    Current Diet Reviewed    Exercise Frequency    ADL - Hearing Impairment    ADL - Bathing    ADL - Dressing    ADL - Walks in Home    IADL - Managing Finances    IADL - Grocery Shopping    IADL - Taking Medications    IADL - Doing Housework      Patient Care Team:  Diony Tamayo MD as PCP - General  Diony Tamayo MD as PCP - [facility] Attributed Provider      Review of Systems  Reports bump on his back x 2 yrs.  No size changes.  Thinks it's a lipoma.  Will make appt for eval if he has any concerns.    Objective   Vitals:  /62   Pulse 60   Temp 36.4 °C (97.5 °F)   Resp 16   Ht 1.829 m (6')   Wt 89.7 kg (197 lb 12.8 oz)   SpO2 98%   BMI 26.83 kg/m²       Physical Exam  Constitutional:       General: He is not in acute distress.     Appearance: Normal appearance.   Musculoskeletal:      Comments: Ambulates w/o assistance   Neurological:      Mental Status: He is oriented to person, place, and time.   Psychiatric:         Behavior: Behavior normal.     Assessment/Plan   Diagnoses and all orders for this visit:  Medicare annual wellness visit, subsequent  Type 2 diabetes mellitus with microalbuminuria, without long-term current use of insulin (CMS/[facility])  -     Basic Metabolic Panel; Future  -     Vitamin B12; Future  -     Hemoglobin A1C; Future  -     Referral to Ophthalmology; Future  -     Referral to Podiatry; Future  Iron deficiency anemia, unspecified iron deficiency anemia type  -     CBC; Future  -     Iron and TIBC; Future  Arteriosclerotic coronary artery disease  -     Lipid Panel; Future  -     Aspartate Aminotransferase; Future  -     Alanine Aminotransferase; Future  -     Tx by cardiology  Hyperlipidemia, unspecified hyperlipidemia type  -     Lipid Panel; Future  -     Aspartate Aminotransferase; Future  -     Alanine Aminotransferase; Future  -     Tx by cardiology  Essential hypertension  -     Basic Metabolic Panel; Future        -     Tx by cardiology    Risk factors identified during visit:   BMI<18.5 or >25: Recommend weight loss efforts (see [email or web address].org/category/nutrition for ideas)  BMI<18.5 or >25: Patient declines nutrition referrals    Flu shot: Up to date.  PPSV23: Up to date.  PCV13: Up to date.  PCV20: N/A.  Shingrix: Up to date.  Colon cancer screening: Up to date (2022, repeat in 5 years).  AAA screening: Up to  date.  HIV screening: N/A.  Hepatitis C screening: Up to date.  Prostate cancer screening: N/A.    Fasting labs.  Will refill meds after reviewing lab results.  Ophthalmology (eye exam) and podiatry (foot exam) referrals.    F/U 6 months: Med refills.

## 2024-01-06 DIAGNOSIS — D50.9 IRON DEFICIENCY ANEMIA, UNSPECIFIED IRON DEFICIENCY ANEMIA TYPE: ICD-10-CM

## 2024-01-06 DIAGNOSIS — E11.9 TYPE 2 DIABETES MELLITUS WITHOUT COMPLICATION, WITHOUT LONG-TERM CURRENT USE OF INSULIN (MULTI): ICD-10-CM

## 2024-01-06 RX ORDER — EMPAGLIFLOZIN 25 MG/1
25 TABLET, FILM COATED ORAL DAILY
Qty: 90 TABLET | Refills: 1 | Status: SHIPPED | OUTPATIENT
Start: 2024-01-06

## 2024-01-06 RX ORDER — FERROUS SULFATE 325(65) MG
1 TABLET ORAL DAILY
Qty: 90 TABLET | Refills: 1 | Status: SHIPPED | OUTPATIENT
Start: 2024-01-06

## 2024-01-06 RX ORDER — METFORMIN HYDROCHLORIDE 1000 MG/1
1000 TABLET ORAL 2 TIMES DAILY
Qty: 180 TABLET | Refills: 1 | Status: SHIPPED | OUTPATIENT
Start: 2024-01-06

## 2024-05-17 ENCOUNTER — OFFICE VISIT (OUTPATIENT)
Dept: NEPHROLOGY | Facility: CLINIC | Age: 74
End: 2024-05-17
Payer: MEDICARE

## 2024-05-17 VITALS
HEART RATE: 67 BPM | DIASTOLIC BLOOD PRESSURE: 72 MMHG | HEIGHT: 72 IN | BODY MASS INDEX: 24.93 KG/M2 | SYSTOLIC BLOOD PRESSURE: 123 MMHG | OXYGEN SATURATION: 98 % | WEIGHT: 184.08 LBS

## 2024-05-17 DIAGNOSIS — E11.69 TYPE 2 DIABETES MELLITUS WITH HYPERLIPIDEMIA (MULTI): ICD-10-CM

## 2024-05-17 DIAGNOSIS — E11.29 MICROALBUMINURIA DUE TO TYPE 2 DIABETES MELLITUS (MULTI): Primary | ICD-10-CM

## 2024-05-17 DIAGNOSIS — E78.5 TYPE 2 DIABETES MELLITUS WITH HYPERLIPIDEMIA (MULTI): ICD-10-CM

## 2024-05-17 DIAGNOSIS — R80.9 MICROALBUMINURIA DUE TO TYPE 2 DIABETES MELLITUS (MULTI): Primary | ICD-10-CM

## 2024-05-17 PROCEDURE — 1157F ADVNC CARE PLAN IN RCRD: CPT | Performed by: NURSE PRACTITIONER

## 2024-05-17 PROCEDURE — 3044F HG A1C LEVEL LT 7.0%: CPT | Performed by: NURSE PRACTITIONER

## 2024-05-17 PROCEDURE — 1160F RVW MEDS BY RX/DR IN RCRD: CPT | Performed by: NURSE PRACTITIONER

## 2024-05-17 PROCEDURE — 3078F DIAST BP <80 MM HG: CPT | Performed by: NURSE PRACTITIONER

## 2024-05-17 PROCEDURE — 3074F SYST BP LT 130 MM HG: CPT | Performed by: NURSE PRACTITIONER

## 2024-05-17 PROCEDURE — 1036F TOBACCO NON-USER: CPT | Performed by: NURSE PRACTITIONER

## 2024-05-17 PROCEDURE — 99212 OFFICE O/P EST SF 10 MIN: CPT | Performed by: NURSE PRACTITIONER

## 2024-05-17 PROCEDURE — 1159F MED LIST DOCD IN RCRD: CPT | Performed by: NURSE PRACTITIONER

## 2024-05-17 PROCEDURE — 3048F LDL-C <100 MG/DL: CPT | Performed by: NURSE PRACTITIONER

## 2024-05-17 ASSESSMENT — ENCOUNTER SYMPTOMS
PSYCHIATRIC NEGATIVE: 1
ENDOCRINE NEGATIVE: 1
RESPIRATORY NEGATIVE: 1
CONSTITUTIONAL NEGATIVE: 1
HEMATOLOGIC/LYMPHATIC NEGATIVE: 1
MUSCULOSKELETAL NEGATIVE: 1
CARDIOVASCULAR NEGATIVE: 1
NEUROLOGICAL NEGATIVE: 1
GASTROINTESTINAL NEGATIVE: 1

## 2024-05-17 NOTE — PROGRESS NOTES
History Of Present Illness  José Miguel Lua is a 73 y.o. male with medical history significant for DMII, albuminuria, CAD s/p CABGx3, HLD, PEDRO, Vit D deficiency, thyroid cancer s/p total thyroidectomy, hypothyroidism, and COVID in December 2022 who presents for a 6-month fuv for albuminuria.      Past Medical History  As above.    Surgical History  He has a past surgical history that includes Total thyroidectomy (10/22/2013); Other surgical history (05/11/2018); and Other surgical history (12/29/2019).     Social History  He reports that he quit smoking about 41 years ago. His smoking use included cigarettes. He started smoking about 55 years ago. He has a 7 pack-year smoking history. He has never used smokeless tobacco. He reports current alcohol use. He reports that he does not currently use drugs.    Family History  Family History   Problem Relation Name Age of Onset    Nephrolithiasis Brother          Allergies  Lisinopril    Review of Systems   Constitutional: Negative.    HENT: Negative.     Respiratory: Negative.     Cardiovascular: Negative.    Gastrointestinal: Negative.    Endocrine: Negative.    Genitourinary: Negative.    Musculoskeletal: Negative.    Skin: Negative.    Neurological: Negative.    Hematological: Negative.    Psychiatric/Behavioral: Negative.          Physical Exam  Constitutional:       Appearance: Normal appearance.   HENT:      Head: Normocephalic and atraumatic.      Mouth/Throat:      Mouth: Mucous membranes are moist.   Cardiovascular:      Rate and Rhythm: Normal rate and regular rhythm.      Pulses: Normal pulses.      Heart sounds: Normal heart sounds.   Pulmonary:      Effort: Pulmonary effort is normal.      Breath sounds: Normal breath sounds.   Musculoskeletal:         General: Normal range of motion.   Skin:     General: Skin is warm and dry.   Neurological:      General: No focal deficit present.      Mental Status: He is alert and oriented to person, place, and time.    Psychiatric:         Mood and Affect: Mood normal.         Behavior: Behavior normal.         Thought Content: Thought content normal.         Judgment: Judgment normal.          Last Recorded Vitals  There were no vitals taken for this visit.    Relevant Results  Recent Results (from the past 4368 hour(s))   Urinalysis with Reflex Microscopic    Collection Time: 12/15/23 10:39 AM   Result Value Ref Range    Color, Urine Yellow Straw, Yellow    Appearance, Urine Clear Clear    Specific Gravity, Urine 1.026 1.005 - 1.035    pH, Urine 6.0 5.0, 5.5, 6.0, 6.5, 7.0, 7.5, 8.0    Protein, Urine NEGATIVE NEGATIVE mg/dL    Glucose, Urine >=500 (3+) (A) NEGATIVE mg/dL    Blood, Urine NEGATIVE NEGATIVE    Ketones, Urine NEGATIVE NEGATIVE mg/dL    Bilirubin, Urine NEGATIVE NEGATIVE    Urobilinogen, Urine <2.0 <2.0 mg/dL    Nitrite, Urine NEGATIVE NEGATIVE    Leukocyte Esterase, Urine NEGATIVE NEGATIVE   CBC    Collection Time: 01/05/24 11:13 AM   Result Value Ref Range    WBC 7.2 4.4 - 11.3 x10*3/uL    nRBC 0.0 0.0 - 0.0 /100 WBCs    RBC 4.75 4.50 - 5.90 x10*6/uL    Hemoglobin 15.4 13.5 - 17.5 g/dL    Hematocrit 46.1 41.0 - 52.0 %    MCV 97 80 - 100 fL    MCH 32.4 26.0 - 34.0 pg    MCHC 33.4 32.0 - 36.0 g/dL    RDW 13.3 11.5 - 14.5 %    Platelets 243 150 - 450 x10*3/uL   Basic Metabolic Panel    Collection Time: 01/05/24 11:13 AM   Result Value Ref Range    Glucose 126 (H) 74 - 99 mg/dL    Sodium 142 136 - 145 mmol/L    Potassium 4.3 3.5 - 5.3 mmol/L    Chloride 102 98 - 107 mmol/L    Bicarbonate 30 21 - 32 mmol/L    Anion Gap 14 10 - 20 mmol/L    Urea Nitrogen 16 6 - 23 mg/dL    Creatinine 0.67 0.50 - 1.30 mg/dL    eGFR >90 >60 mL/min/1.73m*2    Calcium 9.3 8.6 - 10.6 mg/dL   Lipid Panel    Collection Time: 01/05/24 11:13 AM   Result Value Ref Range    Cholesterol 123 0 - 199 mg/dL    HDL-Cholesterol 34.1 mg/dL    Cholesterol/HDL Ratio 3.6     LDL Calculated 70 <=99 mg/dL    VLDL 19 0 - 40 mg/dL    Triglycerides 96 0 - 149  mg/dL    Non HDL Cholesterol 89 0 - 149 mg/dL   Aspartate Aminotransferase    Collection Time: 24 11:13 AM   Result Value Ref Range    AST 19 9 - 39 U/L   Alanine Aminotransferase    Collection Time: 24 11:13 AM   Result Value Ref Range    ALT 21 10 - 52 U/L   Iron and TIBC    Collection Time: 24 11:13 AM   Result Value Ref Range    Iron 84 35 - 150 ug/dL    UIBC 215 110 - 370 ug/dL    TIBC 299 240 - 445 ug/dL    % Saturation 28 25 - 45 %   Vitamin B12    Collection Time: 24 11:13 AM   Result Value Ref Range    Vitamin B12 451 211 - 911 pg/mL   Hemoglobin A1C    Collection Time: 24 11:13 AM   Result Value Ref Range    Hemoglobin A1C 6.7 (H) see below %    Estimated Average Glucose 146 Not Established mg/dL           Assessment/Plan   73-year-old male with medical history significant for DMII, albuminuria, CAD s/p CABGx3, HLD, PEDRO, Vit D deficiency, thyroid cancer s/p total thyroidectomy, hypothyroidism, and COVID in 2022 who presents for a 6-month fuv for microalbuminuria.     # Albuminuria: microalbuminuria since at least 2023 - alb/cr ratio 41.0 ug/mg on 23, and 98.7 ug/mg on 23. Serum creatinine level has been WNL. 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 Most recent urine spot test showed albumin in urine 24.9 mg/L with alb/cr ratio 51.9 micrograms/mg (23) - improving. On SGLT2i.    # DMII: dx in . Most recent HgbA1c 6.7% (24).     Plan:  - Labs: RFP; UA, albumin urine spot.  - Continue to f/u with PCP for optimal DMII management.  - Reviewed strategies for delaying the progression of microalbuminuria includin) Avoidance of NSAIDs including Aleve (Naprosyn), Motrin (Ibuprofen), Mobic (Meloxicam), Celebrex (Celecoxib) and aspirin as well as PPI acid blocking medications such as Prilosec (omeprazole), Protonix (pantoprazole), and Nexium (esomeprazole), as well as other nephrotoxic agents.   2) Avoidance of tobacco or alcohol use.   3) Adequate  hydration daily.   4) Blood pressure target 130/80 mmHg.   5) Daily dietary sodium intake less than 2 grams per day.    6) Maintain healthy lifestyle. Healthy diet and regular exercises.   7) Maintain ideal weight.  - FUV: in 6 months or sooner if concerns arise.     Patient verbalized understanding of above. He will not hesitate to contact Division of Nephrology at [phone] with concerns/questions.     I spent 15 minutes in the professional and overall care of this patient.      Anne-Marie Lew, REMEDIOS-CNP

## 2024-06-01 ENCOUNTER — LAB (OUTPATIENT)
Dept: LAB | Facility: LAB | Age: 74
End: 2024-06-01
Payer: MEDICARE

## 2024-06-01 DIAGNOSIS — R80.9 MICROALBUMINURIA DUE TO TYPE 2 DIABETES MELLITUS (MULTI): ICD-10-CM

## 2024-06-01 DIAGNOSIS — E11.29 TYPE 2 DIABETES MELLITUS WITH OTHER DIABETIC KIDNEY COMPLICATION (MULTI): Primary | ICD-10-CM

## 2024-06-01 DIAGNOSIS — E11.29 MICROALBUMINURIA DUE TO TYPE 2 DIABETES MELLITUS (MULTI): ICD-10-CM

## 2024-06-01 DIAGNOSIS — R80.9 PROTEINURIA, UNSPECIFIED: ICD-10-CM

## 2024-06-01 PROCEDURE — 80069 RENAL FUNCTION PANEL: CPT

## 2024-06-01 PROCEDURE — 36415 COLL VENOUS BLD VENIPUNCTURE: CPT

## 2024-06-02 LAB
ALBUMIN SERPL BCP-MCNC: 4.1 G/DL (ref 3.4–5)
ANION GAP SERPL CALC-SCNC: 14 MMOL/L (ref 10–20)
BUN SERPL-MCNC: 19 MG/DL (ref 6–23)
CALCIUM SERPL-MCNC: 8.7 MG/DL (ref 8.6–10.6)
CHLORIDE SERPL-SCNC: 102 MMOL/L (ref 98–107)
CO2 SERPL-SCNC: 27 MMOL/L (ref 21–32)
CREAT SERPL-MCNC: 0.7 MG/DL (ref 0.5–1.3)
EGFRCR SERPLBLD CKD-EPI 2021: >90 ML/MIN/1.73M*2
GLUCOSE SERPL-MCNC: 293 MG/DL (ref 74–99)
PHOSPHATE SERPL-MCNC: 3.4 MG/DL (ref 2.5–4.9)
POTASSIUM SERPL-SCNC: 4.2 MMOL/L (ref 3.5–5.3)
SODIUM SERPL-SCNC: 139 MMOL/L (ref 136–145)

## 2024-06-03 ENCOUNTER — LAB (OUTPATIENT)
Dept: LAB | Facility: LAB | Age: 74
End: 2024-06-03
Payer: MEDICARE

## 2024-06-03 DIAGNOSIS — E11.29 TYPE 2 DIABETES MELLITUS WITH OTHER DIABETIC KIDNEY COMPLICATION (MULTI): ICD-10-CM

## 2024-06-03 DIAGNOSIS — E11.29 MICROALBUMINURIA DUE TO TYPE 2 DIABETES MELLITUS (MULTI): ICD-10-CM

## 2024-06-03 DIAGNOSIS — R80.9 PROTEINURIA, UNSPECIFIED: ICD-10-CM

## 2024-06-03 DIAGNOSIS — R80.9 MICROALBUMINURIA DUE TO TYPE 2 DIABETES MELLITUS (MULTI): ICD-10-CM

## 2024-06-03 LAB
APPEARANCE UR: CLEAR
BILIRUB UR STRIP.AUTO-MCNC: NEGATIVE MG/DL
COLOR UR: ABNORMAL
CREAT UR-MCNC: 43.3 MG/DL (ref 20–370)
GLUCOSE UR STRIP.AUTO-MCNC: ABNORMAL MG/DL
KETONES UR STRIP.AUTO-MCNC: NEGATIVE MG/DL
LEUKOCYTE ESTERASE UR QL STRIP.AUTO: NEGATIVE
MICROALBUMIN UR-MCNC: 36.7 MG/L
MICROALBUMIN/CREAT UR: 84.8 UG/MG CREAT
MUCOUS THREADS #/AREA URNS AUTO: ABNORMAL /LPF
NITRITE UR QL STRIP.AUTO: NEGATIVE
PH UR STRIP.AUTO: 5 [PH]
PROT UR STRIP.AUTO-MCNC: NEGATIVE MG/DL
RBC # UR STRIP.AUTO: ABNORMAL /UL
RBC #/AREA URNS AUTO: ABNORMAL /HPF
SP GR UR STRIP.AUTO: 1.03
UROBILINOGEN UR STRIP.AUTO-MCNC: NORMAL MG/DL
WBC #/AREA URNS AUTO: ABNORMAL /HPF

## 2024-06-03 PROCEDURE — 82570 ASSAY OF URINE CREATININE: CPT

## 2024-06-03 PROCEDURE — 82043 UR ALBUMIN QUANTITATIVE: CPT

## 2024-06-03 PROCEDURE — 87086 URINE CULTURE/COLONY COUNT: CPT

## 2024-06-03 PROCEDURE — 81001 URINALYSIS AUTO W/SCOPE: CPT

## 2024-06-04 DIAGNOSIS — N13.8 BPH WITH OBSTRUCTION/LOWER URINARY TRACT SYMPTOMS: ICD-10-CM

## 2024-06-04 DIAGNOSIS — N40.1 BPH WITH OBSTRUCTION/LOWER URINARY TRACT SYMPTOMS: ICD-10-CM

## 2024-06-04 LAB
BACTERIA UR CULT: NO GROWTH
HOLD SPECIMEN: NORMAL

## 2024-06-04 RX ORDER — TADALAFIL 5 MG/1
5 TABLET ORAL DAILY
Qty: 90 TABLET | Refills: 3 | Status: SHIPPED | OUTPATIENT
Start: 2024-06-04

## 2024-07-05 ENCOUNTER — APPOINTMENT (OUTPATIENT)
Dept: UROLOGY | Facility: HOSPITAL | Age: 74
End: 2024-07-05
Payer: MEDICARE

## 2024-07-12 ENCOUNTER — APPOINTMENT (OUTPATIENT)
Dept: PRIMARY CARE | Facility: CLINIC | Age: 74
End: 2024-07-12
Payer: MEDICARE

## 2024-07-18 ENCOUNTER — APPOINTMENT (OUTPATIENT)
Dept: PRIMARY CARE | Facility: CLINIC | Age: 74
End: 2024-07-18
Payer: MEDICARE

## 2024-07-18 ENCOUNTER — LAB (OUTPATIENT)
Dept: LAB | Facility: LAB | Age: 74
End: 2024-07-18
Payer: MEDICARE

## 2024-07-18 VITALS
WEIGHT: 203 LBS | RESPIRATION RATE: 16 BRPM | BODY MASS INDEX: 27.5 KG/M2 | DIASTOLIC BLOOD PRESSURE: 68 MMHG | OXYGEN SATURATION: 97 % | HEART RATE: 57 BPM | SYSTOLIC BLOOD PRESSURE: 126 MMHG | HEIGHT: 72 IN

## 2024-07-18 DIAGNOSIS — E11.29 TYPE 2 DIABETES MELLITUS WITH DIABETIC MICROALBUMINURIA, WITHOUT LONG-TERM CURRENT USE OF INSULIN (MULTI): ICD-10-CM

## 2024-07-18 DIAGNOSIS — R80.9 TYPE 2 DIABETES MELLITUS WITH DIABETIC MICROALBUMINURIA, WITHOUT LONG-TERM CURRENT USE OF INSULIN (MULTI): ICD-10-CM

## 2024-07-18 DIAGNOSIS — R80.9 TYPE 2 DIABETES MELLITUS WITH DIABETIC MICROALBUMINURIA, WITHOUT LONG-TERM CURRENT USE OF INSULIN (MULTI): Primary | ICD-10-CM

## 2024-07-18 DIAGNOSIS — E11.29 TYPE 2 DIABETES MELLITUS WITH DIABETIC MICROALBUMINURIA, WITHOUT LONG-TERM CURRENT USE OF INSULIN (MULTI): Primary | ICD-10-CM

## 2024-07-18 LAB
EST. AVERAGE GLUCOSE BLD GHB EST-MCNC: 143 MG/DL
HBA1C MFR BLD: 6.6 %

## 2024-07-18 PROCEDURE — 83036 HEMOGLOBIN GLYCOSYLATED A1C: CPT

## 2024-07-18 PROCEDURE — 3048F LDL-C <100 MG/DL: CPT | Performed by: FAMILY MEDICINE

## 2024-07-18 PROCEDURE — 3060F POS MICROALBUMINURIA REV: CPT | Performed by: FAMILY MEDICINE

## 2024-07-18 PROCEDURE — 3078F DIAST BP <80 MM HG: CPT | Performed by: FAMILY MEDICINE

## 2024-07-18 PROCEDURE — 36415 COLL VENOUS BLD VENIPUNCTURE: CPT

## 2024-07-18 PROCEDURE — 1160F RVW MEDS BY RX/DR IN RCRD: CPT | Performed by: FAMILY MEDICINE

## 2024-07-18 PROCEDURE — 1157F ADVNC CARE PLAN IN RCRD: CPT | Performed by: FAMILY MEDICINE

## 2024-07-18 PROCEDURE — 1036F TOBACCO NON-USER: CPT | Performed by: FAMILY MEDICINE

## 2024-07-18 PROCEDURE — 99214 OFFICE O/P EST MOD 30 MIN: CPT | Performed by: FAMILY MEDICINE

## 2024-07-18 PROCEDURE — 3044F HG A1C LEVEL LT 7.0%: CPT | Performed by: FAMILY MEDICINE

## 2024-07-18 PROCEDURE — 3074F SYST BP LT 130 MM HG: CPT | Performed by: FAMILY MEDICINE

## 2024-07-18 PROCEDURE — 1159F MED LIST DOCD IN RCRD: CPT | Performed by: FAMILY MEDICINE

## 2024-07-18 PROCEDURE — 1123F ACP DISCUSS/DSCN MKR DOCD: CPT | Performed by: FAMILY MEDICINE

## 2024-07-18 NOTE — PROGRESS NOTES
Subjective   Patient ID: José Miguel Lua is a 73 y.o. male who presents for Med Refill.    HPI  Has DM.  Condition(s) stable.  Taking med(s) as directed.  Requests refills.    States he saw podiatry.  Will need form signed for DM shoes when available.    Of note:  H/O Anemia.  Takes otc iron (no longer needs Rx).    Review of Systems  No other complaints.     Objective   /68   Pulse 57   Resp 16   Ht 1.829 m (6')   Wt 92.1 kg (203 lb)   SpO2 97%   BMI 27.53 kg/m²     Physical Exam  Constitutional:       General: He is not in acute distress.     Appearance: He is overweight.   Cardiovascular:      Rate and Rhythm: Regular rhythm. Bradycardia present.      Heart sounds: Normal heart sounds. No murmur heard.     No friction rub. No gallop.   Pulmonary:      Effort: Pulmonary effort is normal.      Breath sounds: Normal breath sounds. No wheezing or rhonchi.   Neurological:      Mental Status: He is oriented to person, place, and time.   Psychiatric:         Mood and Affect: Mood normal.         Behavior: Behavior normal.     Assessment/Plan   Diagnoses and all orders for this visit:  Type 2 diabetes mellitus with diabetic microalbuminuria, without long-term current use of insulin ([facility])  -     Hemoglobin A1C; Future    Nonfasting labs.  Will refill meds after reviewing lab results.    F/U 6 months: Annual wellness visit.

## 2024-07-18 NOTE — PATIENT INSTRUCTIONS
Nonfasting labs.  Will refill meds after reviewing lab results.    F/U 6 months: Annual wellness visit.    Lab services: [address]  Hours: M-F 6:30a-6p, Sat 8a-12p  Phone: [phone], Option 1

## 2024-07-19 ENCOUNTER — OFFICE VISIT (OUTPATIENT)
Dept: UROLOGY | Facility: HOSPITAL | Age: 74
End: 2024-07-19
Payer: MEDICARE

## 2024-07-19 DIAGNOSIS — N40.1 BPH WITH OBSTRUCTION/LOWER URINARY TRACT SYMPTOMS: ICD-10-CM

## 2024-07-19 DIAGNOSIS — N13.8 BPH WITH OBSTRUCTION/LOWER URINARY TRACT SYMPTOMS: ICD-10-CM

## 2024-07-19 DIAGNOSIS — R33.9 URINARY RETENTION: Primary | ICD-10-CM

## 2024-07-19 PROCEDURE — 3044F HG A1C LEVEL LT 7.0%: CPT | Performed by: UROLOGY

## 2024-07-19 PROCEDURE — 3048F LDL-C <100 MG/DL: CPT | Performed by: UROLOGY

## 2024-07-19 PROCEDURE — 3060F POS MICROALBUMINURIA REV: CPT | Performed by: UROLOGY

## 2024-07-19 PROCEDURE — 1157F ADVNC CARE PLAN IN RCRD: CPT | Performed by: UROLOGY

## 2024-07-19 PROCEDURE — 1123F ACP DISCUSS/DSCN MKR DOCD: CPT | Performed by: UROLOGY

## 2024-07-19 PROCEDURE — 99214 OFFICE O/P EST MOD 30 MIN: CPT | Performed by: UROLOGY

## 2024-07-19 PROCEDURE — 51798 US URINE CAPACITY MEASURE: CPT | Performed by: UROLOGY

## 2024-07-19 NOTE — PROGRESS NOTES
HPI  73 y.o. male being seen with the following problem list:    Problem list:  BPH with LUTS - urgency, frequency and borderline urinary retention  Hx of gross hematuria     Note per Dr. Ventura 12/15/23:  José Miguel Lua is a 73 y.o. male who is being seen today for FUV     Hx:  -gross hematuria  -CT urogram 6/9/23 unremarkable  -negative cystoscopy  -urinary urgency and NTF on daily dosing Cialis   ----------------------------------------------------------------------------  1/4/24 - here today to discuss outlet procedures due to hx of urinary symptoms. He has some retention along with some nocturia, not as bothersome or troublesome at this time. No red flag symptoms. No UTIs, hematuruia, stones. PVR last mo with Dr. Ventura 193cc    7/19/24 - Seen today for 6mo symptom check. PVR 298cc. Reports some intermittent urgency with incomplete emptying, sometimes double voiding. Overall not too bothered    Current Medications:  Current Outpatient Medications   Medication Sig Dispense Refill    aspirin 81 mg chewable tablet Chew 1 tablet (81 mg) once daily.      atorvastatin (Lipitor) 80 mg tablet TAKE 1 TABLET DAILY AT BEDTIME 90 tablet 3    blood sugar diagnostic (FreeStyle Lite Strips) strip TEST ONCE DAILY 100 strip 3    calcium carbonate-vitamin D3 600 mg-20 mcg (800 unit) tablet Take 2 tablets by mouth once daily.      cetirizine (ZyrTEC) 10 mg tablet Take 1 tablet (10 mg) by mouth once daily.      ciclopirox (Penlac) 8 % solution Apply topically once daily at bedtime.      famotidine (Pepcid) 10 mg tablet Take 1 tablet (10 mg) by mouth once daily.      ferrous sulfate, 325 mg ferrous sulfate, tablet Take 1 tablet by mouth once daily. 90 tablet 1    fish oil concentrate (Omega-3) 120-180 mg capsule Take by mouth once daily.      FreeStyle Lancets 28 gauge TEST ONCE DAILY 100 each 3    Jardiance 25 mg Take 1 tablet (25 mg) by mouth once daily. 90 tablet 1    metFORMIN (Glucophage) 1,000 mg tablet Take 1 tablet (1,000  mg) by mouth 2 times a day. 180 tablet 1    metoprolol tartrate (Lopressor) 50 mg tablet Take 1 tablet by mouth 2 times a day.      multivitamin (Multiple Vitamins) tablet Take 1 tablet by mouth once daily.      nitroglycerin (Nitrostat) 0.4 mg SL tablet Place 1 tablet (0.4 mg) under the tongue every 5 minutes if needed.      SITagliptin phosphate (Januvia) 100 mg tablet Take 1 tablet (100 mg) by mouth once daily. 90 tablet 1    Synthroid 150 mcg tablet Take 1 tablet (150 mcg) by mouth once daily.      tadalafil (Cialis) 5 mg tablet Take 1 tablet (5 mg) by mouth once daily. 90 tablet 3     No current facility-administered medications for this visit.        Active Problems:  José Miguel Lua is a 73 y.o. male with the following Problems and Medications.  Patient Active Problem List   Diagnosis    Arteriosclerotic coronary artery disease    Cancer of thyroid (Multi)    Essential hypertension    Hypothyroidism    Iron deficiency anemia    Microalbuminuria due to type 2 diabetes mellitus (Multi)    S/P CABG x 3    Type 2 diabetes mellitus with hyperlipidemia (Multi)    Athscl heart disease of native coronary artery w/o ang pctrs    BPH with obstruction/lower urinary tract symptoms    Hyperlipidemia     Current Outpatient Medications   Medication Sig Dispense Refill    aspirin 81 mg chewable tablet Chew 1 tablet (81 mg) once daily.      atorvastatin (Lipitor) 80 mg tablet TAKE 1 TABLET DAILY AT BEDTIME 90 tablet 3    blood sugar diagnostic (FreeStyle Lite Strips) strip TEST ONCE DAILY 100 strip 3    calcium carbonate-vitamin D3 600 mg-20 mcg (800 unit) tablet Take 2 tablets by mouth once daily.      cetirizine (ZyrTEC) 10 mg tablet Take 1 tablet (10 mg) by mouth once daily.      ciclopirox (Penlac) 8 % solution Apply topically once daily at bedtime.      famotidine (Pepcid) 10 mg tablet Take 1 tablet (10 mg) by mouth once daily.      ferrous sulfate, 325 mg ferrous sulfate, tablet Take 1 tablet by mouth once daily. 90  tablet 1    fish oil concentrate (Omega-3) 120-180 mg capsule Take by mouth once daily.      FreeStyle Lancets 28 gauge TEST ONCE DAILY 100 each 3    Jardiance 25 mg Take 1 tablet (25 mg) by mouth once daily. 90 tablet 1    metFORMIN (Glucophage) 1,000 mg tablet Take 1 tablet (1,000 mg) by mouth 2 times a day. 180 tablet 1    metoprolol tartrate (Lopressor) 50 mg tablet Take 1 tablet by mouth 2 times a day.      multivitamin (Multiple Vitamins) tablet Take 1 tablet by mouth once daily.      nitroglycerin (Nitrostat) 0.4 mg SL tablet Place 1 tablet (0.4 mg) under the tongue every 5 minutes if needed.      SITagliptin phosphate (Januvia) 100 mg tablet Take 1 tablet (100 mg) by mouth once daily. 90 tablet 1    Synthroid 150 mcg tablet Take 1 tablet (150 mcg) by mouth once daily.      tadalafil (Cialis) 5 mg tablet Take 1 tablet (5 mg) by mouth once daily. 90 tablet 3     No current facility-administered medications for this visit.       PMH:  Past Medical History:   Diagnosis Date    Atherosclerotic heart disease of native coronary artery with unspecified angina pectoris (CMS-[facility]) 06/23/2021    Coronary artery disease involving native coronary artery of native heart with angina pectoris    Atherosclerotic heart disease of native coronary artery with unspecified angina pectoris (CMS-H[facility]) 06/23/2021    Coronary artery disease involving native coronary artery of native heart with angina pectoris    Atherosclerotic heart disease of native coronary artery with unspecified angina pectoris (CMS-HCC) 06/23/2021    Coronary artery disease involving native coronary artery of native heart with angina pectoris    COVID-19 12/31/2022    COVID-19    Encounter for follow-up examination after completed treatment for conditions other than malignant neoplasm 11/25/2019    Hospital discharge follow-up    Encounter for immunization 12/11/2020    Encounter for immunization    Hypocalcemia 12/16/2019    Hypocalcemia    Other conditions  influencing health status 2019    History of cough    Personal history of diseases of the blood and blood-forming organs and certain disorders involving the immune mechanism 2020    History of anemia    Personal history of diseases of the skin and subcutaneous tissue 2020    History of cellulitis    Personal history of malignant neoplasm of thyroid 2017    History of malignant neoplasm of thyroid    Personal history of other drug therapy 2016    History of influenza vaccination    Personal history of other specified conditions 10/29/2019    History of exertional chest pain    Strain of unspecified muscle, fascia and tendon at shoulder and upper arm level, right arm, initial encounter 2017    Right shoulder strain    Toxic effect of venom of bees, accidental (unintentional), initial encounter 2020    Bee sting       PSH:  Past Surgical History:   Procedure Laterality Date    OTHER SURGICAL HISTORY  2018    Parathyroid Surgery    OTHER SURGICAL HISTORY  2019    Coronary artery bypass graft    TOTAL THYROIDECTOMY  10/22/2013    Thyroid Surgery Total Thyroidectomy       FMH:  Family History   Problem Relation Name Age of Onset    Nephrolithiasis Brother         SHx:  Social History     Tobacco Use    Smoking status: Former     Current packs/day: 0.00     Average packs/day: 0.5 packs/day for 14.0 years (7.0 ttl pk-yrs)     Types: Cigarettes     Start date:      Quit date:      Years since quittin.5    Smokeless tobacco: Never   Substance Use Topics    Alcohol use: Yes    Drug use: Not Currently       Allergies:  Allergies   Allergen Reactions    Lisinopril Cough     Assessment/Plan  High PVR today at 298cc, previously 193cc 6 months ago. Discussed coming in for a cysto to evaluate his channel. Discussed that an outlet procedure would be beneficial for him at this time to get him to a safe residual. Briefly discussed a HoLEP. Will discuss further after  cysto. Follow up for cysto in office.      Scribe Attestation  By signing my name below, I, Marti Austin , Scribe   attest that this documentation has been prepared under the direction and in the presence of Cole Multani MD.

## 2024-07-20 DIAGNOSIS — E11.9 TYPE 2 DIABETES MELLITUS WITHOUT COMPLICATION, WITHOUT LONG-TERM CURRENT USE OF INSULIN (MULTI): ICD-10-CM

## 2024-07-20 RX ORDER — METFORMIN HYDROCHLORIDE 1000 MG/1
1000 TABLET ORAL 2 TIMES DAILY
Qty: 180 TABLET | Refills: 1 | Status: SHIPPED | OUTPATIENT
Start: 2024-07-20

## 2024-07-20 RX ORDER — EMPAGLIFLOZIN 25 MG/1
25 TABLET, FILM COATED ORAL DAILY
Qty: 90 TABLET | Refills: 1 | Status: SHIPPED | OUTPATIENT
Start: 2024-07-20

## 2024-07-21 DIAGNOSIS — D50.9 IRON DEFICIENCY ANEMIA, UNSPECIFIED IRON DEFICIENCY ANEMIA TYPE: Primary | ICD-10-CM

## 2024-07-21 DIAGNOSIS — E11.69 TYPE 2 DIABETES MELLITUS WITH HYPERLIPIDEMIA (MULTI): ICD-10-CM

## 2024-07-21 DIAGNOSIS — E11.29 MICROALBUMINURIA DUE TO TYPE 2 DIABETES MELLITUS (MULTI): ICD-10-CM

## 2024-07-21 DIAGNOSIS — E78.5 TYPE 2 DIABETES MELLITUS WITH HYPERLIPIDEMIA (MULTI): ICD-10-CM

## 2024-07-21 DIAGNOSIS — R80.9 MICROALBUMINURIA DUE TO TYPE 2 DIABETES MELLITUS (MULTI): ICD-10-CM

## 2024-08-27 DIAGNOSIS — E03.9 HYPOTHYROIDISM, UNSPECIFIED TYPE: Primary | ICD-10-CM

## 2024-08-27 RX ORDER — LEVOTHYROXINE SODIUM 150 UG/1
150 TABLET ORAL DAILY
Qty: 90 TABLET | Refills: 0 | Status: SHIPPED | OUTPATIENT
Start: 2024-08-27

## 2024-08-27 NOTE — PROGRESS NOTES
"HPI    73 y.o. male being seen with the following problem list:     Problem list:  BPH with LUTS - urgency, frequency and borderline urinary retention  Hx of gross hematuria      Note per Dr. Ventura 12/15/23:  José Miguel Lua is a 73 y.o. male who is being seen today for FUV     Hx:  -gross hematuria  -CT urogram 6/9/23 unremarkable  -negative cystoscopy  -urinary urgency and NTF on daily dosing Cialis   ----------------------------------------------------------------------------  1/4/24 - here today to discuss outlet procedures due to hx of urinary symptoms. He has some retention along with some nocturia, not as bothersome or troublesome at this time. No red flag symptoms. No UTIs, hematuruia, stones. PVR last mo with Dr. Ventura 193cc     7/19/24 - Seen today for 6mo symptom check. PVR 298cc. Reports some intermittent urgency with incomplete emptying, sometimes double voiding. Overall not too bothered    8/28/24 - seen back for cysto. PVR after cysto 310cc    No results found for: \"PSA\"      Current Medications:  Current Outpatient Medications   Medication Sig Dispense Refill    aspirin 81 mg chewable tablet Chew 1 tablet (81 mg) once daily.      atorvastatin (Lipitor) 80 mg tablet TAKE 1 TABLET DAILY AT BEDTIME 90 tablet 3    blood sugar diagnostic (FreeStyle Lite Strips) strip TEST ONCE DAILY 100 strip 3    calcium carbonate-vitamin D3 600 mg-20 mcg (800 unit) tablet Take 2 tablets by mouth once daily.      cetirizine (ZyrTEC) 10 mg tablet Take 1 tablet (10 mg) by mouth once daily.      ciclopirox (Penlac) 8 % solution Apply topically once daily at bedtime.      famotidine (Pepcid) 10 mg tablet Take 1 tablet (10 mg) by mouth once daily.      ferrous sulfate, 325 mg ferrous sulfate, tablet Take 1 tablet by mouth once daily. 90 tablet 1    fish oil concentrate (Omega-3) 120-180 mg capsule Take by mouth once daily.      FreeStyle Lancets 28 gauge TEST ONCE DAILY 100 each 3    Jardiance 25 mg Take 1 tablet (25 mg) " by mouth once daily. 90 tablet 1    metFORMIN (Glucophage) 1,000 mg tablet Take 1 tablet (1,000 mg) by mouth 2 times a day. 180 tablet 1    metoprolol tartrate (Lopressor) 50 mg tablet Take 1 tablet by mouth 2 times a day.      multivitamin (Multiple Vitamins) tablet Take 1 tablet by mouth once daily.      nitroglycerin (Nitrostat) 0.4 mg SL tablet Place 1 tablet (0.4 mg) under the tongue every 5 minutes if needed.      SITagliptin phosphate (Januvia) 100 mg tablet Take 1 tablet (100 mg) by mouth once daily. 90 tablet 1    Synthroid 150 mcg tablet TAKE 1 TABLET DAILY 90 tablet 0    tadalafil (Cialis) 5 mg tablet Take 1 tablet (5 mg) by mouth once daily. 90 tablet 3     No current facility-administered medications for this visit.        Active Problems:  José Miguel Lua is a 73 y.o. male with the following Problems and Medications.  Patient Active Problem List   Diagnosis    Arteriosclerotic coronary artery disease    Cancer of thyroid (Multi)    Essential hypertension    Hypothyroidism    Iron deficiency anemia    Microalbuminuria due to type 2 diabetes mellitus (Multi)    S/P CABG x 3    Type 2 diabetes mellitus with hyperlipidemia (Multi)    Athscl heart disease of native coronary artery w/o ang pctrs    BPH with obstruction/lower urinary tract symptoms    Hyperlipidemia    Urinary retention     Current Outpatient Medications   Medication Sig Dispense Refill    aspirin 81 mg chewable tablet Chew 1 tablet (81 mg) once daily.      atorvastatin (Lipitor) 80 mg tablet TAKE 1 TABLET DAILY AT BEDTIME 90 tablet 3    blood sugar diagnostic (FreeStyle Lite Strips) strip TEST ONCE DAILY 100 strip 3    calcium carbonate-vitamin D3 600 mg-20 mcg (800 unit) tablet Take 2 tablets by mouth once daily.      cetirizine (ZyrTEC) 10 mg tablet Take 1 tablet (10 mg) by mouth once daily.      ciclopirox (Penlac) 8 % solution Apply topically once daily at bedtime.      famotidine (Pepcid) 10 mg tablet Take 1 tablet (10 mg) by mouth  once daily.      ferrous sulfate, 325 mg ferrous sulfate, tablet Take 1 tablet by mouth once daily. 90 tablet 1    fish oil concentrate (Omega-3) 120-180 mg capsule Take by mouth once daily.      FreeStyle Lancets 28 gauge TEST ONCE DAILY 100 each 3    Jardiance 25 mg Take 1 tablet (25 mg) by mouth once daily. 90 tablet 1    metFORMIN (Glucophage) 1,000 mg tablet Take 1 tablet (1,000 mg) by mouth 2 times a day. 180 tablet 1    metoprolol tartrate (Lopressor) 50 mg tablet Take 1 tablet by mouth 2 times a day.      multivitamin (Multiple Vitamins) tablet Take 1 tablet by mouth once daily.      nitroglycerin (Nitrostat) 0.4 mg SL tablet Place 1 tablet (0.4 mg) under the tongue every 5 minutes if needed.      SITagliptin phosphate (Januvia) 100 mg tablet Take 1 tablet (100 mg) by mouth once daily. 90 tablet 1    Synthroid 150 mcg tablet TAKE 1 TABLET DAILY 90 tablet 0    tadalafil (Cialis) 5 mg tablet Take 1 tablet (5 mg) by mouth once daily. 90 tablet 3     No current facility-administered medications for this visit.       PMH:  Past Medical History:   Diagnosis Date    Atherosclerotic heart disease of native coronary artery with unspecified angina pectoris (CMS-[facility]) 06/23/2021    Coronary artery disease involving native coronary artery of native heart with angina pectoris    Atherosclerotic heart disease of native coronary artery with unspecified angina pectoris (CMS-[facility]) 06/23/2021    Coronary artery disease involving native coronary artery of native heart with angina pectoris    Atherosclerotic heart disease of native coronary artery with unspecified angina pectoris (CMS-HCC) 06/23/2021    Coronary artery disease involving native coronary artery of native heart with angina pectoris    COVID-19 12/31/2022    COVID-19    Encounter for follow-up examination after completed treatment for conditions other than malignant neoplasm 11/25/2019    Hospital discharge follow-up    Encounter for immunization 12/11/2020     Encounter for immunization    Hypocalcemia 2019    Hypocalcemia    Other conditions influencing health status 2019    History of cough    Personal history of diseases of the blood and blood-forming organs and certain disorders involving the immune mechanism 2020    History of anemia    Personal history of diseases of the skin and subcutaneous tissue 2020    History of cellulitis    Personal history of malignant neoplasm of thyroid 2017    History of malignant neoplasm of thyroid    Personal history of other drug therapy 2016    History of influenza vaccination    Personal history of other specified conditions 10/29/2019    History of exertional chest pain    Strain of unspecified muscle, fascia and tendon at shoulder and upper arm level, right arm, initial encounter 2017    Right shoulder strain    Toxic effect of venom of bees, accidental (unintentional), initial encounter 2020    Bee sting       PSH:  Past Surgical History:   Procedure Laterality Date    OTHER SURGICAL HISTORY  2018    Parathyroid Surgery    OTHER SURGICAL HISTORY  2019    Coronary artery bypass graft    TOTAL THYROIDECTOMY  10/22/2013    Thyroid Surgery Total Thyroidectomy       FMH:  Family History   Problem Relation Name Age of Onset    Nephrolithiasis Brother         SHx:  Social History     Tobacco Use    Smoking status: Former     Current packs/day: 0.00     Average packs/day: 0.5 packs/day for 14.0 years (7.0 ttl pk-yrs)     Types: Cigarettes     Start date:      Quit date:      Years since quittin.6    Smokeless tobacco: Never   Substance Use Topics    Alcohol use: Yes    Drug use: Not Currently       Allergies:  Allergies   Allergen Reactions    Lisinopril Cough       Procedure:  After informed consent was obtained, the patient was taken to the procedure room for cystoscopy due to incomplete emptying.     Cystoscopy     Procedure Note:    A sterile prep and drape was  performed in standard fashion. Lidocaine was used for topical anesthesia. A flexible cystoscope was inserted into the urethra without difficulty revealing normal urethra.     The prostate was small, trilobar with a very obstructive intravesical component     Then entered the bladder revealing bladder mucosa with no erythematous patches or plaques, foreign bodies, stones or papillary lesions. Heavily trabeculated. Anterior tic. The ureteral orifices were visualized bilaterally. These were orthotopic in location and effluxing clear urine. No masses were seen on retroflexion. Significant intravesical protrusion in a ball-valving configuration    Post-Procedure:   The cystoscope was removed. The vital signs were stable . The patient tolerated the procedure well. There were no complications.              Assessment/Plan    Cystoscopy shows a very obstructive intravesical extension of the prostate.  PVR is rising, 310 today.  Bladder shows evidence of long-term outlet obstruction.  At this point recommend surgery to D obstructive prostate and preserve bladder function and reduce the risk of ongoing deterioration.    We discussed surgical options for his prostate and bothersome LUTS. We discussed various options including TURP, greenlight, Rezum, Urolift, HoLEP. We discussed HoLEP as a size-independent procedure that maximally de-obstructs the prostate with relatively less postop healing and recovery as compared to other modalities. We discussed the surgery in detail. Discussed the risks of bleeding, infection, scarring, transient incontinence, rare (<1%) risk of long-term incontinence. Discussed the perioperative pathway. Discussed the near certainty of permanent ejaculatory dysfunction, but likely no change to his baseline erectile function.     Discussed he may never get to 0 but should be significantly improved and will likely not get worse.  He is keen to proceed in October 2024.  Will schedule accordingly      Scribe  Attestation  By signing my name below, I, Ana Quiles , Scribkavitha attest that this documentation has been prepared under the direction and in the presence of Cole Multani MD.

## 2024-08-28 ENCOUNTER — PROCEDURE VISIT (OUTPATIENT)
Dept: UROLOGY | Facility: HOSPITAL | Age: 74
End: 2024-08-28
Payer: MEDICARE

## 2024-08-28 DIAGNOSIS — R33.9 URINARY RETENTION: ICD-10-CM

## 2024-08-28 DIAGNOSIS — N40.1 BPH WITH OBSTRUCTION/LOWER URINARY TRACT SYMPTOMS: Primary | ICD-10-CM

## 2024-08-28 DIAGNOSIS — N13.8 BPH WITH OBSTRUCTION/LOWER URINARY TRACT SYMPTOMS: Primary | ICD-10-CM

## 2024-08-28 PROCEDURE — 99214 OFFICE O/P EST MOD 30 MIN: CPT | Performed by: UROLOGY

## 2024-08-28 PROCEDURE — 52000 CYSTOURETHROSCOPY: CPT | Performed by: UROLOGY

## 2024-08-28 RX ORDER — SODIUM CHLORIDE, SODIUM LACTATE, POTASSIUM CHLORIDE, CALCIUM CHLORIDE 600; 310; 30; 20 MG/100ML; MG/100ML; MG/100ML; MG/100ML
20 INJECTION, SOLUTION INTRAVENOUS CONTINUOUS
OUTPATIENT
Start: 2024-08-28

## 2024-08-28 RX ORDER — CEFAZOLIN SODIUM 2 G/100ML
2 INJECTION, SOLUTION INTRAVENOUS ONCE
OUTPATIENT
Start: 2024-08-28 | End: 2024-08-28

## 2024-09-06 ENCOUNTER — LAB (OUTPATIENT)
Dept: LAB | Facility: LAB | Age: 74
End: 2024-09-06
Payer: MEDICARE

## 2024-09-06 ENCOUNTER — OFFICE VISIT (OUTPATIENT)
Dept: ENDOCRINOLOGY | Facility: CLINIC | Age: 74
End: 2024-09-06
Payer: MEDICARE

## 2024-09-06 ENCOUNTER — TELEPHONE (OUTPATIENT)
Dept: PRIMARY CARE | Facility: CLINIC | Age: 74
End: 2024-09-06

## 2024-09-06 VITALS
DIASTOLIC BLOOD PRESSURE: 66 MMHG | SYSTOLIC BLOOD PRESSURE: 122 MMHG | BODY MASS INDEX: 26.93 KG/M2 | HEIGHT: 72 IN | RESPIRATION RATE: 16 BRPM | HEART RATE: 66 BPM | WEIGHT: 198.8 LBS

## 2024-09-06 DIAGNOSIS — E03.9 HYPOTHYROIDISM, UNSPECIFIED TYPE: ICD-10-CM

## 2024-09-06 DIAGNOSIS — C73 CANCER OF THYROID (MULTI): ICD-10-CM

## 2024-09-06 DIAGNOSIS — C73 CANCER OF THYROID (MULTI): Primary | ICD-10-CM

## 2024-09-06 DIAGNOSIS — E83.51 HYPOCALCEMIA: ICD-10-CM

## 2024-09-06 LAB — TSH SERPL-ACNC: 1.95 MIU/L (ref 0.44–3.98)

## 2024-09-06 PROCEDURE — 86800 THYROGLOBULIN ANTIBODY: CPT

## 2024-09-06 PROCEDURE — 84443 ASSAY THYROID STIM HORMONE: CPT

## 2024-09-06 PROCEDURE — 84432 ASSAY OF THYROGLOBULIN: CPT

## 2024-09-06 PROCEDURE — 3078F DIAST BP <80 MM HG: CPT | Performed by: INTERNAL MEDICINE

## 2024-09-06 PROCEDURE — 3074F SYST BP LT 130 MM HG: CPT | Performed by: INTERNAL MEDICINE

## 2024-09-06 PROCEDURE — 1036F TOBACCO NON-USER: CPT | Performed by: INTERNAL MEDICINE

## 2024-09-06 PROCEDURE — 99214 OFFICE O/P EST MOD 30 MIN: CPT | Performed by: INTERNAL MEDICINE

## 2024-09-06 PROCEDURE — 1160F RVW MEDS BY RX/DR IN RCRD: CPT | Performed by: INTERNAL MEDICINE

## 2024-09-06 PROCEDURE — 3060F POS MICROALBUMINURIA REV: CPT | Performed by: INTERNAL MEDICINE

## 2024-09-06 PROCEDURE — 3044F HG A1C LEVEL LT 7.0%: CPT | Performed by: INTERNAL MEDICINE

## 2024-09-06 PROCEDURE — 1159F MED LIST DOCD IN RCRD: CPT | Performed by: INTERNAL MEDICINE

## 2024-09-06 PROCEDURE — 1123F ACP DISCUSS/DSCN MKR DOCD: CPT | Performed by: INTERNAL MEDICINE

## 2024-09-06 PROCEDURE — 1157F ADVNC CARE PLAN IN RCRD: CPT | Performed by: INTERNAL MEDICINE

## 2024-09-06 PROCEDURE — 3048F LDL-C <100 MG/DL: CPT | Performed by: INTERNAL MEDICINE

## 2024-09-06 PROCEDURE — 36415 COLL VENOUS BLD VENIPUNCTURE: CPT

## 2024-09-06 ASSESSMENT — ENCOUNTER SYMPTOMS
NAUSEA: 0
FEVER: 0
CHILLS: 0
SHORTNESS OF BREATH: 0
LIGHT-HEADEDNESS: 0
DIZZINESS: 0
VOMITING: 0
DIARRHEA: 0

## 2024-09-06 NOTE — ASSESSMENT & PLAN NOTE
Tsh due   Orders:    Thyroglobulin and Antithyroglobulin; Future    TSH with reflex to Free T4 if abnormal; Future

## 2024-09-06 NOTE — PROGRESS NOTES
Endocrinology: Follow up visit  Subjective   Patient ID: José Miguel Lua is a 73 y.o. male who presents for Hypothyroidism, Thyroid Cancer, and Diabetes (Type 2).    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   Respiratory:  Negative for shortness of breath.    Gastrointestinal:  Negative for diarrhea, nausea and vomiting.   Endocrine: Negative for cold intolerance and heat intolerance.   Neurological:  Negative for dizziness and light-headedness.       Patient Active Problem List   Diagnosis    Arteriosclerotic coronary artery disease    Cancer of thyroid (Multi)    Essential hypertension    Hypothyroidism    Iron deficiency anemia    Microalbuminuria due to type 2 diabetes mellitus (Multi)    S/P CABG x 3    Type 2 diabetes mellitus with hyperlipidemia (Multi)    Athscl heart disease of native coronary artery w/o ang pctrs    BPH with obstruction/lower urinary tract symptoms    Hyperlipidemia    Urinary retention        Home Meds:  Current Outpatient Medications   Medication Instructions    aspirin 81 mg, oral, Daily    atorvastatin (LIPITOR) 80 mg, oral, Nightly    blood sugar diagnostic (FreeStyle Lite Strips) strip TEST ONCE DAILY    calcium carbonate-vitamin D3 600 mg-20 mcg (800 unit) tablet 2 tablets, oral, Daily    cetirizine (ZYRTEC) 10 mg, oral, Daily    ciclopirox (Penlac) 8 % solution Topical, Nightly    famotidine (PEPCID) 10 mg, oral, Daily    ferrous sulfate, 325 mg ferrous sulfate, tablet 1 tablet, oral, Daily    fish oil concentrate (Omega-3) 120-180 mg capsule oral, Daily    FreeStyle Lancets 28 gauge TEST ONCE DAILY     "Jardiance 25 mg, oral, Daily    metFORMIN (GLUCOPHAGE) 1,000 mg, oral, 2 times daily    metoprolol tartrate (LOPRESSOR) 50 mg, oral, 2 times daily    multivitamin (Multiple Vitamins) tablet 1 tablet, oral, Daily    nitroglycerin (NITROSTAT) 0.4 mg, sublingual, Every 5 min PRN    SITagliptin phosphate (JANUVIA) 100 mg, oral, Daily    Synthroid 150 mcg, oral, Daily    tadalafil (CIALIS) 5 mg, oral, Daily        Allergies   Allergen Reactions    Lisinopril Cough        Objective   Vitals:    09/06/24 1043   BP: 122/66   Pulse: 66   Resp: 16      Vitals:    09/06/24 1043   Weight: 90.2 kg (198 lb 12.8 oz)      Body mass index is 26.96 kg/m².   Physical Exam  Constitutional:       Appearance: Normal appearance. He is overweight.   HENT:      Head: Normocephalic and atraumatic.   Neck:      Comments: No palpable thyroid gland  Cardiovascular:      Rate and Rhythm: Normal rate and regular rhythm.      Heart sounds: No murmur heard.     No gallop.   Pulmonary:      Effort: Pulmonary effort is normal.      Breath sounds: Normal breath sounds.   Abdominal:      Palpations: Abdomen is soft.      Comments: benign   Neurological:      General: No focal deficit present.      Mental Status: He is alert and oriented to person, place, and time.      Deep Tendon Reflexes: Reflexes are normal and symmetric.   Psychiatric:         Behavior: Behavior is cooperative.         Labs:  Lab Results   Component Value Date    HGBA1C 6.6 (H) 07/18/2024    TSH 3.32 09/01/2023    FREET4 1.47 09/01/2023      No results found for: \"PR1\", \"THYROIDPAB\", \"TSI\"     Assessment/Plan   Assessment & Plan  Cancer of thyroid (Multi)  Tg with labs  Orders:    Thyroglobulin and Antithyroglobulin; Future    TSH with reflex to Free T4 if abnormal; Future    Hypothyroidism, unspecified type  Tsh due   Orders:    Thyroglobulin and Antithyroglobulin; Future    TSH with reflex to Free T4 if abnormal; Future    Hypocalcemia    Stable on calcium supplement.  Recent " check normal      Electronically signed by:  Camelia Rodriguez MD 09/06/24 10:44 AM

## 2024-09-09 LAB
BILL ONLY-THYROGLOBULIN: NORMAL
THYROGLOB AB SERPL-ACNC: <0.9 IU/ML (ref 0–4)
THYROGLOB SERPL-MCNC: <0.1 NG/ML (ref 1.3–31.8)
THYROGLOB SERPL-MCNC: ABNORMAL NG/ML (ref 1.3–31.8)

## 2024-09-10 DIAGNOSIS — E03.9 HYPOTHYROIDISM, UNSPECIFIED TYPE: ICD-10-CM

## 2024-09-10 RX ORDER — LEVOTHYROXINE SODIUM 150 UG/1
150 TABLET ORAL DAILY
Qty: 90 TABLET | Refills: 3 | Status: SHIPPED | OUTPATIENT
Start: 2024-09-10

## 2024-09-15 ENCOUNTER — OFFICE VISIT (OUTPATIENT)
Dept: URGENT CARE | Age: 74
End: 2024-09-15
Payer: MEDICARE

## 2024-09-15 VITALS
WEIGHT: 201.6 LBS | OXYGEN SATURATION: 100 % | RESPIRATION RATE: 18 BRPM | HEART RATE: 86 BPM | BODY MASS INDEX: 27.34 KG/M2

## 2024-09-15 DIAGNOSIS — N30.01 ACUTE CYSTITIS WITH HEMATURIA: Primary | ICD-10-CM

## 2024-09-15 DIAGNOSIS — R31.0 GROSS HEMATURIA: ICD-10-CM

## 2024-09-15 DIAGNOSIS — R35.0 FREQUENT URINATION: ICD-10-CM

## 2024-09-15 LAB
POC APPEARANCE, URINE: ABNORMAL
POC BILIRUBIN, URINE: NEGATIVE
POC BLOOD, URINE: ABNORMAL
POC COLOR, URINE: ABNORMAL
POC GLUCOSE, URINE: ABNORMAL MG/DL
POC KETONES, URINE: NEGATIVE MG/DL
POC LEUKOCYTES, URINE: ABNORMAL
POC NITRITE,URINE: NEGATIVE
POC PH, URINE: 6 PH
POC PROTEIN, URINE: ABNORMAL MG/DL
POC SPECIFIC GRAVITY, URINE: 1.02
POC UROBILINOGEN, URINE: 0.2 EU/DL

## 2024-09-15 PROCEDURE — 87086 URINE CULTURE/COLONY COUNT: CPT

## 2024-09-15 RX ORDER — CIPROFLOXACIN 500 MG/1
500 TABLET ORAL 2 TIMES DAILY
Qty: 14 TABLET | Refills: 0 | Status: SHIPPED | OUTPATIENT
Start: 2024-09-15 | End: 2024-09-22

## 2024-09-15 NOTE — PROGRESS NOTES
Subjective   Patient ID: José Miguel Lua is a 73 y.o. male. They present today with a chief complaint of Urinary Problem (Urine fequency).    History of Present Illness  72 yo male coming in for urinary urgency, frequency, and hematuria. He denies any abdominal pain or back pain. He denies any fevers or chills.     Past Medical History  Allergies as of 09/15/2024 - Reviewed 09/15/2024   Allergen Reaction Noted    Lisinopril Cough 06/01/2023       (Not in a hospital admission)       Past Medical History:   Diagnosis Date    Atherosclerotic heart disease of native coronary artery with unspecified angina pectoris (CMS-HCC) 06/23/2021    Coronary artery disease involving native coronary artery of native heart with angina pectoris    Atherosclerotic heart disease of native coronary artery with unspecified angina pectoris (CMS-HCC) 06/23/2021    Coronary artery disease involving native coronary artery of native heart with angina pectoris    Atherosclerotic heart disease of native coronary artery with unspecified angina pectoris (CMS-HCC) 06/23/2021    Coronary artery disease involving native coronary artery of native heart with angina pectoris    COVID-19 12/31/2022    COVID-19    Encounter for follow-up examination after completed treatment for conditions other than malignant neoplasm 11/25/2019    Hospital discharge follow-up    Encounter for immunization 12/11/2020    Encounter for immunization    Hypocalcemia 12/16/2019    Hypocalcemia    Other conditions influencing health status 07/25/2019    History of cough    Personal history of diseases of the blood and blood-forming organs and certain disorders involving the immune mechanism 12/11/2020    History of anemia    Personal history of diseases of the skin and subcutaneous tissue 09/09/2020    History of cellulitis    Personal history of malignant neoplasm of thyroid 08/25/2017    History of malignant neoplasm of thyroid    Personal history of other drug therapy  09/30/2016    History of influenza vaccination    Personal history of other specified conditions 10/29/2019    History of exertional chest pain    Strain of unspecified muscle, fascia and tendon at shoulder and upper arm level, right arm, initial encounter 11/08/2017    Right shoulder strain    Toxic effect of venom of bees, accidental (unintentional), initial encounter 09/09/2020    Bee sting       Past Surgical History:   Procedure Laterality Date    OTHER SURGICAL HISTORY  05/11/2018    Parathyroid Surgery    OTHER SURGICAL HISTORY  12/29/2019    Coronary artery bypass graft    TOTAL THYROIDECTOMY  10/22/2013    Thyroid Surgery Total Thyroidectomy        reports that he quit smoking about 41 years ago. His smoking use included cigarettes. He started smoking about 55 years ago. He has a 7 pack-year smoking history. He has never used smokeless tobacco. He reports current alcohol use. He reports that he does not currently use drugs.    Review of Systems  Review of Systems:  General: No weight loss, fatigue, anorexia, insomnia, fever, chills.  Cardiac: No chest pain, palpitations, syncope, near syncope.  Pulmonary:  No shortness of breath, cough, hemoptysis  Heme/lymph: No swollen glands, fever, bleeding  GI: No abdominal pain, change in bowel habits, melena, hematemesis, hematochezia, nausea, vomiting, or diarrhea  : No discharge, positive dysuria, frequency, urgency, hematuria  Skin: No rashes  Neuro: No numbness, tingling, headaches                                 Objective    Vitals:    09/15/24 1342   Pulse: 86   Resp: 18   SpO2: 100%   Weight: 91.4 kg (201 lb 9.6 oz)     No LMP for male patient.    Physical Exam  Physical Exam:  General: Vital noted, no distress. Afebrile  Cardiac: Regular rate and rhythm, no murmur  Pulmonary: Lungs clear bilaterally with good aeration. No adventitious breath sounds.  Abdomen: Soft, nontender, nonsurgical. No peritoneal signs. Normoactive bowel sounds.   Musculoskeletal: No  peripheral edema.   Skin: No rashes  Neuro: No focal neurologic deficits, NIH score of 0.      Procedures    Point of Care Test & Imaging Results from this visit  Results for orders placed or performed in visit on 09/15/24   POCT UA Automated manually resulted   Result Value Ref Range    POC Color, Urine Red (A) Straw, Yellow, Light-Yellow    POC Appearance, Urine Turbid (A) Clear    POC Glucose, Urine 500 (3+) (A) NEGATIVE mg/dl    POC Bilirubin, Urine NEGATIVE NEGATIVE    POC Ketones, Urine NEGATIVE NEGATIVE mg/dl    POC Specific Gravity, Urine 1.020 1.005 - 1.035    POC Blood, Urine LARGE (3+) (A) NEGATIVE    POC PH, Urine 6.0 No Reference Range Established PH    POC Protein, Urine 100 (2+) (A) NEGATIVE, 30 (1+) mg/dl    POC Urobilinogen, Urine 0.2 0.2, 1.0 EU/DL    Poc Nitrite, Urine NEGATIVE NEGATIVE    POC Leukocytes, Urine SMALL (1+) (A) NEGATIVE      No results found.    Diagnostic study results (if any) were reviewed by OUMAR Montes.    Assessment/Plan   Allergies, medications, history, and pertinent labs/EKGs/Imaging reviewed by OUMAR Montes.     Medical Decision Making  Testing: UA- as above, Urine culture- pending  Treatment: Cipro prescribed  Differential: 1)  uti, 2) dysuria , 3) pyelonephritis  Plan: Discussed differential with the pateint. Patient will follow up with the PCP in the next 2-3 days. Return for any worsening symptoms or go to the ER for further evaluation. Patient understands return precautions and discharege insturctions.  Impression:   1) uti      Orders and Diagnoses  Diagnoses and all orders for this visit:  Acute cystitis with hematuria  -     ciprofloxacin (Cipro) 500 mg tablet; Take 1 tablet (500 mg) by mouth 2 times a day for 7 days.  Gross hematuria  -     POCT UA Automated manually resulted  -     Urine Culture  Frequent urination  -     POCT UA Automated manually resulted  -     Urine Culture      Medical Admin Record      Follow Up Instructions  No  follow-ups on file.    Patient disposition: Home    Electronically signed by OUMAR Montes  2:18 PM

## 2024-09-17 LAB — BACTERIA UR CULT: NO GROWTH

## 2024-10-07 ENCOUNTER — CLINICAL SUPPORT (OUTPATIENT)
Dept: PREADMISSION TESTING | Facility: HOSPITAL | Age: 74
End: 2024-10-07
Payer: OTHER GOVERNMENT

## 2024-10-07 DIAGNOSIS — N40.1 BPH WITH OBSTRUCTION/LOWER URINARY TRACT SYMPTOMS: ICD-10-CM

## 2024-10-07 DIAGNOSIS — N13.8 BPH WITH OBSTRUCTION/LOWER URINARY TRACT SYMPTOMS: ICD-10-CM

## 2024-10-07 RX ORDER — ACETAMINOPHEN 325 MG/1
TABLET ORAL EVERY 6 HOURS PRN
COMMUNITY

## 2024-10-07 RX ORDER — CHOLECALCIFEROL (VITAMIN D3) 25 MCG
1000 TABLET ORAL DAILY
COMMUNITY

## 2024-10-14 ENCOUNTER — DOCUMENTATION (OUTPATIENT)
Dept: PREADMISSION TESTING | Facility: HOSPITAL | Age: 74
End: 2024-10-14

## 2024-10-14 ENCOUNTER — PRE-ADMISSION TESTING (OUTPATIENT)
Dept: PREADMISSION TESTING | Facility: HOSPITAL | Age: 74
End: 2024-10-14
Payer: MEDICARE

## 2024-10-14 ENCOUNTER — LAB (OUTPATIENT)
Dept: LAB | Facility: LAB | Age: 74
End: 2024-10-14
Payer: OTHER GOVERNMENT

## 2024-10-14 VITALS
DIASTOLIC BLOOD PRESSURE: 68 MMHG | BODY MASS INDEX: 27.17 KG/M2 | TEMPERATURE: 97.2 F | OXYGEN SATURATION: 99 % | HEIGHT: 72 IN | HEART RATE: 80 BPM | SYSTOLIC BLOOD PRESSURE: 144 MMHG | WEIGHT: 200.62 LBS | RESPIRATION RATE: 18 BRPM

## 2024-10-14 DIAGNOSIS — N13.8 BPH WITH OBSTRUCTION/LOWER URINARY TRACT SYMPTOMS: ICD-10-CM

## 2024-10-14 DIAGNOSIS — N40.1 BPH WITH OBSTRUCTION/LOWER URINARY TRACT SYMPTOMS: ICD-10-CM

## 2024-10-14 DIAGNOSIS — I25.10 ARTERIOSCLEROTIC CORONARY ARTERY DISEASE: ICD-10-CM

## 2024-10-14 DIAGNOSIS — Z01.818 PREOP EXAMINATION: Primary | ICD-10-CM

## 2024-10-14 LAB
ANION GAP SERPL CALC-SCNC: 11 MMOL/L (ref 10–20)
APPEARANCE UR: CLEAR
ATRIAL RATE: 69 BPM
BILIRUB UR STRIP.AUTO-MCNC: NEGATIVE MG/DL
BUN SERPL-MCNC: 13 MG/DL (ref 6–23)
CALCIUM SERPL-MCNC: 8.7 MG/DL (ref 8.6–10.3)
CHLORIDE SERPL-SCNC: 103 MMOL/L (ref 98–107)
CO2 SERPL-SCNC: 30 MMOL/L (ref 21–32)
COLOR UR: ABNORMAL
CREAT SERPL-MCNC: 0.67 MG/DL (ref 0.5–1.3)
EGFRCR SERPLBLD CKD-EPI 2021: >90 ML/MIN/1.73M*2
ERYTHROCYTE [DISTWIDTH] IN BLOOD BY AUTOMATED COUNT: 14.1 % (ref 11.5–14.5)
GLUCOSE SERPL-MCNC: 137 MG/DL (ref 74–99)
GLUCOSE UR STRIP.AUTO-MCNC: ABNORMAL MG/DL
HCT VFR BLD AUTO: 45.7 % (ref 41–52)
HGB BLD-MCNC: 14.9 G/DL (ref 13.5–17.5)
KETONES UR STRIP.AUTO-MCNC: NEGATIVE MG/DL
LEUKOCYTE ESTERASE UR QL STRIP.AUTO: NEGATIVE
MCH RBC QN AUTO: 32 PG (ref 26–34)
MCHC RBC AUTO-ENTMCNC: 32.6 G/DL (ref 32–36)
MCV RBC AUTO: 98 FL (ref 80–100)
NITRITE UR QL STRIP.AUTO: NEGATIVE
NRBC BLD-RTO: 0 /100 WBCS (ref 0–0)
P AXIS: 41 DEGREES
P OFFSET: 203 MS
P ONSET: 157 MS
PH UR STRIP.AUTO: 7 [PH]
PLATELET # BLD AUTO: 191 X10*3/UL (ref 150–450)
POTASSIUM SERPL-SCNC: 4.4 MMOL/L (ref 3.5–5.3)
PR INTERVAL: 130 MS
PROT UR STRIP.AUTO-MCNC: NEGATIVE MG/DL
Q ONSET: 222 MS
QRS COUNT: 11 BEATS
QRS DURATION: 128 MS
QT INTERVAL: 398 MS
QTC CALCULATION(BAZETT): 426 MS
QTC FREDERICIA: 417 MS
R AXIS: 81 DEGREES
RBC # BLD AUTO: 4.65 X10*6/UL (ref 4.5–5.9)
RBC # UR STRIP.AUTO: NEGATIVE /UL
SODIUM SERPL-SCNC: 140 MMOL/L (ref 136–145)
SP GR UR STRIP.AUTO: 1.03
T AXIS: 27 DEGREES
T OFFSET: 421 MS
UROBILINOGEN UR STRIP.AUTO-MCNC: NORMAL MG/DL
VENTRICULAR RATE: 69 BPM
WBC # BLD AUTO: 6.9 X10*3/UL (ref 4.4–11.3)

## 2024-10-14 PROCEDURE — 93010 ELECTROCARDIOGRAM REPORT: CPT | Performed by: INTERNAL MEDICINE

## 2024-10-14 PROCEDURE — 80048 BASIC METABOLIC PNL TOTAL CA: CPT

## 2024-10-14 PROCEDURE — 81003 URINALYSIS AUTO W/O SCOPE: CPT

## 2024-10-14 PROCEDURE — 36415 COLL VENOUS BLD VENIPUNCTURE: CPT

## 2024-10-14 PROCEDURE — 93005 ELECTROCARDIOGRAM TRACING: CPT | Performed by: NURSE PRACTITIONER

## 2024-10-14 PROCEDURE — 85027 COMPLETE CBC AUTOMATED: CPT

## 2024-10-14 RX ORDER — ATORVASTATIN CALCIUM 80 MG/1
80 TABLET, FILM COATED ORAL NIGHTLY
Qty: 90 TABLET | Refills: 3 | Status: SHIPPED | OUTPATIENT
Start: 2024-10-14

## 2024-10-14 ASSESSMENT — ENCOUNTER SYMPTOMS
ENDOCRINE NEGATIVE: 1
VISUAL CHANGE: 1
GASTROINTESTINAL NEGATIVE: 1
NECK NEGATIVE: 1
RESPIRATORY NEGATIVE: 1
NEUROLOGICAL NEGATIVE: 1
MUSCULOSKELETAL NEGATIVE: 1
CONSTITUTIONAL NEGATIVE: 1

## 2024-10-14 NOTE — CPM/PAT H&P
[facility] Evaluation       Name: José Miguel Lua (José Miguel Lua)  /Age: 1950/74 y.o.     In-Person       HPI        Date of Consult: 10/14/24    Referring Provider: Dr. Multani    Surgery, Date, and Length: Holmium Laser Enucleation Transurethral Prostate 10/28/24    José Miguel Lua is a 74 year-old male who presents to the [facility] for perioperative risk assessment prior to surgery.    Patient presents with a primary diagnosis of BPH and urinary retention. urinary urgency, frequency, and hematuria. He denies any abdominal pain or back pain. He denies any fevers or chills.     This note was created in part upon personal review of patient's medical records.      Patient is scheduled to have Holmium Laser Enucleation Transurethral Prostate      Pt denies any past history of anesthetic complications such as PONV, awareness, prolonged sedation, dental damage, aspiration, cardiac arrest, difficult intubation, difficult I.V. access or unexpected hospital admissions.  NO malignant hyperthermia and or pseudocholinesterase deficiency.  No history of blood transfusions     STOP BANG 2    The patient is not a [employer] and will accept blood and blood products if medically indicated.   Type and screen not sent.     Past Medical History:   Diagnosis Date    Anemia     15.4/46.4 1/5    Atherosclerotic heart disease of native coronary artery with unspecified angina pectoris 2021    Coronary artery disease involving native coronary artery of native heart with angina pectoris    BPH (benign prostatic hyperplasia)     Disease of thyroid gland     thyroid cancer    GERD (gastroesophageal reflux disease)     Hyperlipidemia     Hypertension     Hypocalcemia 2019    Hypocalcemia/ s/p parathyroidectomy of one nodule    Type 2 diabetes mellitus     a1c 6.6        Past Surgical History:   Procedure Laterality Date    OTHER SURGICAL HISTORY  2018    Parathyroid Surgery    OTHER SURGICAL HISTORY   2019    Coronary artery bypass graft/ cardiologist dr. young 23 f/u 1 year    TOTAL THYROIDECTOMY  10/22/2013    Thyroid Surgery Total Thyroidectomy       Social History     Tobacco Use    Smoking status: Former     Current packs/day: 0.00     Average packs/day: 0.5 packs/day for 14.0 years (7.0 ttl pk-yrs)     Types: Cigarettes     Start date:      Quit date:      Years since quittin.8    Smokeless tobacco: Never   Substance Use Topics    Alcohol use: Yes     Alcohol/week: 2.0 standard drinks of alcohol     Types: 2 Standard drinks or equivalent per week    Drug use: Not Currently        Family History   Problem Relation Name Age of Onset    Dementia Mother      Alcohol abuse Father      Nephrolithiasis Brother      No Known Problems Half-Brother         Allergies   Allergen Reactions    Lisinopril Cough       Current Outpatient Medications:     acetaminophen (Tylenol) 325 mg tablet, Take by mouth every 6 hours if needed for mild pain (1 - 3)., Disp: , Rfl:     aspirin 81 mg chewable tablet, Chew 1 tablet (81 mg) once daily., Disp: , Rfl:     atorvastatin (Lipitor) 80 mg tablet, TAKE 1 TABLET DAILY AT BEDTIME, Disp: 90 tablet, Rfl: 3    calcium carbonate-vitamin D3 600 mg-20 mcg (800 unit) tablet, Take 2 tablets by mouth 2 times a day., Disp: , Rfl:     cetirizine (ZyrTEC) 10 mg tablet, Take 1 tablet (10 mg) by mouth once daily., Disp: , Rfl:     cholecalciferol (Vitamin D3) 25 MCG (1000 UT) tablet, Take 1 tablet (1,000 Units) by mouth once daily., Disp: , Rfl:     ciclopirox (Penlac) 8 % solution, Apply topically once daily at bedtime., Disp: , Rfl:     famotidine (Pepcid) 10 mg tablet, Take 1 tablet (10 mg) by mouth once daily., Disp: , Rfl:     ferrous sulfate, 325 mg ferrous sulfate, tablet, Take 1 tablet by mouth once daily., Disp: 90 tablet, Rfl: 1    fish oil concentrate (Omega-3) 120-180 mg capsule, Take by mouth once daily., Disp: , Rfl:     Jardiance 25 mg, Take 1 tablet (25 mg)  "by mouth once daily., Disp: 90 tablet, Rfl: 1    metFORMIN (Glucophage) 1,000 mg tablet, Take 1 tablet (1,000 mg) by mouth 2 times a day., Disp: 180 tablet, Rfl: 1    multivitamin (Multiple Vitamins) tablet, Take 1 tablet by mouth once daily., Disp: , Rfl:     SITagliptin phosphate (Januvia) 100 mg tablet, Take 1 tablet (100 mg) by mouth once daily., Disp: 90 tablet, Rfl: 1    Synthroid 150 mcg tablet, Take 1 tablet (150 mcg) by mouth once daily., Disp: 90 tablet, Rfl: 3    tadalafil (Cialis) 5 mg tablet, Take 1 tablet (5 mg) by mouth once daily., Disp: 90 tablet, Rfl: 3    blood sugar diagnostic (FreeStyle Lite Strips) strip, TEST ONCE DAILY, Disp: 100 strip, Rfl: 3    FreeStyle Lancets 28 gauge, TEST ONCE DAILY, Disp: 100 each, Rfl: 3    metoprolol tartrate (Lopressor) 50 mg tablet, Take 1 tablet by mouth 2 times a day., Disp: , Rfl:     nitroglycerin (Nitrostat) 0.4 mg SL tablet, Place 1 tablet (0.4 mg) under the tongue every 5 minutes if needed., Disp: , Rfl:         PAT ROS:   Constitutional:   neg    Neuro/Psych:   neg    Eyes:    vision loss  Ears:   neg    Nose:   neg    Mouth:   neg    Throat:   neg    Neck:   neg    Cardio:    Functional 4 mets. Denies sob walking from [facility] to parking lot. Walks a mile every day  Respiratory:   neg    Endocrine:   neg    GI:   neg    :   neg    Musculoskeletal:   neg    Hematologic:   neg    Skin:  neg        Physical Exam  Vitals reviewed. Physical exam within normal limits.          PAT AIRWAY:   Airway:     Mallampati::  II    Neck ROM::  Full  normal        Heart Rate:  [80]   Temp:  [36.2 °C (97.2 °F)]   Resp:  [18]   BP: (144)/(68)   Height:  [182 cm (5' 11.65\")]   Weight:  [91 kg (200 lb 9.9 oz)]   SpO2:  [99 %]      EKG in [facility] 10/14/24:   SR with PACs  RBBB  Abnormal EKG  HR 69 bpm    Lab Results   Component Value Date    HGBA1C 6.6 (H) 07/18/2024      Lab Results   Component Value Date    WBC 6.9 10/14/2024    HGB 14.9 10/14/2024    HCT 45.7 10/14/2024    MCV 98 " "10/14/2024     10/14/2024      Lab Results   Component Value Date    GLUCOSE 137 (H) 10/14/2024    CALCIUM 8.7 10/14/2024     10/14/2024    K 4.4 10/14/2024    CO2 30 10/14/2024     10/14/2024    BUN 13 10/14/2024    CREATININE 0.67 10/14/2024        Assessment and Plan:         Patient is a 74-year-old male scheduled for a with Dr. Multani on 10/28/24 .  Patient has no active cardiac symptoms.   Patient denies any chest pain, tightness, heaviness, pressure, radiating pain, palpitations, irregular heartbeats, lightheadedness, cough, congestion, shortness of breath, JIMENEZ, PND, near syncope, weight loss or gain.     RCRI  2  , 10 % Risk of MACE    Cardiology:  -- Controlled HTN: EKG ordered  -- History of CAD s/p CABG. Being monitored by Dr. Cervantes. Pending cardiac clearance from Dr. Cervantes      SEEN BY DR PHIL CERVANTES ON 11/4/24 PER NOTE:    \"Assessment/Plan   74-year-old male with HTN, dyslipidemia, non-insulin-dependent DM, CAD with CABG x 3(LIMA-LAD, rad-PLV, DAGMAR-PDA) 11/2019 who is doing well over all with no interval cardiac events in the past year. Continue aspirin and statin. lipids well controlled.  Scheduled for urology procedure next week.  He has no ischemic signs or symptoms.  Exercise capacity greater than 4 METS.  Overall low to moderate risk and may proceed with HOLEP as scheduled.  Continue aspirin and statin.  I will plan to follow-up with him in about 1 year or sooner should the need arise.\"    Hematology:  Patient instructed to ambulate as soon as possible postoperatively to decrease thromboembolic risk.   Initiate mechanical DVT prophylaxis as soon as possible and initiate chemical prophylaxis when deemed safe from a bleeding standpoint post surgery.     Capjenifferi: 5    Renal:  -- Recommendations to avoid nephrotoxic drugs and carefully monitor fluid status to maintain euvolemia. Use dose adjusted medications as needed for the underlying level of renal function.    LABS PER DR." MARLENE    Risk assessment complete.  Patient is scheduled for a low surgical risk procedure.        Preoperative medication instructions were provided and reviewed with the patient.  Any additional testing or evaluation was explained to the patient.  Nothing by mouth instructions were discussed and patient's questions were answered prior to conclusion to this encounter.  Patient verbalized understanding of preoperative instructions given in preadmission testing; discharge instructions available in EMR.    This note was dictated by a speech recognition.  Minor errors may have been detected in a speech recognition.

## 2024-10-14 NOTE — TELEPHONE ENCOUNTER
LOV 11/2023 note reviewed. Annual FUV scheduled for 11/24. Script cued and forwarded for signature

## 2024-10-14 NOTE — PREPROCEDURE INSTRUCTIONS
Medication List            Accurate as of October 14, 2024 10:17 AM. Always use your most recent med list.                acetaminophen 325 mg tablet  Commonly known as: Tylenol  Medication Adjustments for Surgery: Take/Use as prescribed     aspirin 81 mg chewable tablet  Medication Adjustments for Surgery: Take/Use as prescribed     atorvastatin 80 mg tablet  Commonly known as: Lipitor  TAKE 1 TABLET DAILY AT BEDTIME  Medication Adjustments for Surgery: Take/Use as prescribed     calcium carbonate-vitamin D3 600 mg-20 mcg (800 unit) tablet  Medication Adjustments for Surgery: Do Not take on the morning of surgery     ciclopirox 8 % solution  Commonly known as: Penlac  Medication Adjustments for Surgery: Take/Use as prescribed     famotidine 10 mg tablet  Commonly known as: Pepcid  Medication Adjustments for Surgery: Take/Use as prescribed     ferrous sulfate (325 mg ferrous sulfate) tablet  Take 1 tablet by mouth once daily.  Medication Adjustments for Surgery: Do Not take on the morning of surgery     fish oil concentrate 120-180 mg capsule  Commonly known as: Omega-3  Additional Medication Adjustments for Surgery: Take last dose 7 days before surgery     FreeStyle lancets 28 gauge  Commonly known as: FreeStyle Lancets  TEST ONCE DAILY  Medication Adjustments for Surgery: Take/Use as prescribed     FreeStyle Lite Strips strip  Generic drug: blood sugar diagnostic  TEST ONCE DAILY  Medication Adjustments for Surgery: Take/Use as prescribed     Jardiance 25 mg  Generic drug: empagliflozin  Take 1 tablet (25 mg) by mouth once daily.  Medication Adjustments for Surgery: Take last dose 3 days before surgery     metFORMIN 1,000 mg tablet  Commonly known as: Glucophage  Take 1 tablet (1,000 mg) by mouth 2 times a day.  Medication Adjustments for Surgery: Do Not take on the morning of surgery     metoprolol tartrate 50 mg tablet  Commonly known as: Lopressor  Medication Adjustments for Surgery: Take on the morning of  surgery     Multiple Vitamins tablet  Generic drug: multivitamin  Additional Medication Adjustments for Surgery: Take last dose 7 days before surgery     nitroglycerin 0.4 mg SL tablet  Commonly known as: Nitrostat  Medication Adjustments for Surgery: Take/Use as prescribed     SITagliptin phosphate 100 mg tablet  Commonly known as: Januvia  Take 1 tablet (100 mg) by mouth once daily.  Medication Adjustments for Surgery: Do Not take on the morning of surgery     Synthroid 150 mcg tablet  Generic drug: levothyroxine  Take 1 tablet (150 mcg) by mouth once daily.  Medication Adjustments for Surgery: Take/Use as prescribed     tadalafil 5 mg tablet  Commonly known as: Cialis  Take 1 tablet (5 mg) by mouth once daily.     Vitamin D3 25 MCG (1000 UT) tablet  Generic drug: cholecalciferol  Medication Adjustments for Surgery: Do Not take on the morning of surgery     ZyrTEC 10 mg tablet  Generic drug: cetirizine  Medication Adjustments for Surgery: Do Not take on the morning of surgery                      **Concerning above medication instructions, if medication is normally taken at night, continue normal schedule.**  **DO NOT TAKE NIGHT PRIOR AND MORNING OF SURGERY**    CONTACT SURGEON'S OFFICE IF YOU DEVELOP:  * Fever = 100.4 F   * New respiratory symptoms (e.g. cough, shortness of breath, respiratory distress, sore throat)  * Recent loss of taste or smell  *Flu like symptoms such as headache, fatigue or gastrointestinal symptoms  * You develop any open sores, shingles, burning or painful urination   AND/OR:  * You no longer wish to have the surgery.  * Any other personal circumstances change that may lead to the need to cancel or defer this surgery.  *You were admitted to any hospital within one week of your planned procedure.    SMOKING:  *Quitting smoking can make a huge difference to your health and recovery from surgery.    *If you need help with quitting, call 8-646-QUIT-NOW.    THE DAY BEFORE SURGERY:   Nothing by  mouth (no solids or liquids) after midnight.   If diabetic, please check fasting blood sugar upon waking up.    If fasting sugar is <80 mg/dl, please drink 100ml/3oz of apple juice no later than 2 hours prior to arrival time.      SURGICAL TIME  *You will be contacted between 2 p.m. and 6 p.m. the business day before your surgery with your arrival time.  *If you haven't received a call by 6pm, call [phone].  *Scheduled surgery times may change and you will be notified if this occurs-check your personal voicemail for any updates.    ON THE MORNING OF SURGERY:  *Wear comfortable, loose fitting clothing.   *Do not use moisturizers, creams, lotions or perfume.  *All jewelry and valuables should be left at home.  *Prosthetic devices such as contact lenses, hearing aids, dentures, eyelash extensions, hairpins and body piercing must be removed before surgery.    BRING WITH YOU:  *Photo ID and insurance card  *Current list of medicines and allergies  *Pacemaker/Defibrillator/Heart stent cards  *CPAP machine and mask  *Slings/splints/crutches  *Copy of your complete Advanced Directive/DHPOA-if applicable  *Neurostimulator implant remote    PARKING AND ARRIVAL:  *Check in at the Main Entrance desk and let them know you are here for surgery.  *You will be directed to the 2nd floor surgical waiting area.    AFTER OUTPATIENT SURGERY:  *A responsible adult MUST accompany you at the time of discharge and stay with you for 24 hours after your surgery.  *You may NOT drive yourself home after surgery.  *You may use a taxi or ride sharing service ([employer], Uber) to return home ONLY if you are accompanied by a friend or family member.  *Instructions for resuming your medications will be provided by your surgeon.

## 2024-10-14 NOTE — CPM/PAT NURSE NOTE
CPM/PAT Nurse Note      Name: José Miguel Lua (José Miguel Lua)  /Age: 1950/74 y.o.       Past Medical History:   Diagnosis Date    Anemia     15.4/46.4 1    Atherosclerotic heart disease of native coronary artery with unspecified angina pectoris 2021    Coronary artery disease involving native coronary artery of native heart with angina pectoris    BPH (benign prostatic hyperplasia)     Disease of thyroid gland     thyroid cancer    GERD (gastroesophageal reflux disease)     Hyperlipidemia     Hypertension     Hypocalcemia 2019    Hypocalcemia/ s/p parathyroidectomy of one nodule    Type 2 diabetes mellitus     a1c 6.6        Past Surgical History:   Procedure Laterality Date    OTHER SURGICAL HISTORY  2018    Parathyroid Surgery    OTHER SURGICAL HISTORY  2019    Coronary artery bypass graft/ cardiologist dr. young 23 f/u 1 year    TOTAL THYROIDECTOMY  10/22/2013    Thyroid Surgery Total Thyroidectomy       Patient  has no history on file for sexual activity.    Family History   Problem Relation Name Age of Onset    Dementia Mother      Alcohol abuse Father      Nephrolithiasis Brother      No Known Problems Half-Brother         Allergies   Allergen Reactions    Lisinopril Cough       Prior to Admission medications    Medication Sig Start Date End Date Taking? Authorizing Provider   acetaminophen (Tylenol) 325 mg tablet Take by mouth every 6 hours if needed for mild pain (1 - 3).    Historical Provider, MD   aspirin 81 mg chewable tablet Chew 1 tablet (81 mg) once daily. 19   Historical Provider, MD   atorvastatin (Lipitor) 80 mg tablet TAKE 1 TABLET DAILY AT BEDTIME 23   Neri Young DO   blood sugar diagnostic (FreeStyle Lite Strips) strip TEST ONCE DAILY 23   Diony Tamayo MD   calcium carbonate-vitamin D3 600 mg-20 mcg (800 unit) tablet Take 2 tablets by mouth 2 times a day.    Historical Provider, MD   cetirizine (ZyrTEC) 10 mg tablet Take  1 tablet (10 mg) by mouth once daily. 9/5/20   Historical Provider, MD   cholecalciferol (Vitamin D3) 25 MCG (1000 UT) tablet Take 1 tablet (1,000 Units) by mouth once daily.    Historical Provider, MD   ciclopirox (Penlac) 8 % solution Apply topically once daily at bedtime.    Historical Provider, MD   famotidine (Pepcid) 10 mg tablet Take 1 tablet (10 mg) by mouth once daily.    Historical Provider, MD   ferrous sulfate, 325 mg ferrous sulfate, tablet Take 1 tablet by mouth once daily. 1/6/24   Diony Tamayo MD   fish oil concentrate (Omega-3) 120-180 mg capsule Take by mouth once daily.    Historical Provider, MD   FreeStyle Lancets 28 gauge TEST ONCE DAILY 9/18/23   Diony Tamayo MD   Jardiance 25 mg Take 1 tablet (25 mg) by mouth once daily. 7/20/24   Diony Tamayo MD   metFORMIN (Glucophage) 1,000 mg tablet Take 1 tablet (1,000 mg) by mouth 2 times a day. 7/20/24   Diony Tamayo MD   metoprolol tartrate (Lopressor) 50 mg tablet Take 1 tablet by mouth 2 times a day. 11/14/19   Historical Provider, MD   multivitamin (Multiple Vitamins) tablet Take 1 tablet by mouth once daily. 11/25/19   Historical Provider, MD   nitroglycerin (Nitrostat) 0.4 mg SL tablet Place 1 tablet (0.4 mg) under the tongue every 5 minutes if needed. 10/29/19   Historical Provider, MD   SITagliptin phosphate (Januvia) 100 mg tablet Take 1 tablet (100 mg) by mouth once daily. 7/20/24   Diony Tamayo MD   Synthroid 150 mcg tablet Take 1 tablet (150 mcg) by mouth once daily. 9/10/24   Camelia Rodriguez MD   tadalafil (Cialis) 5 mg tablet Take 1 tablet (5 mg) by mouth once daily. 6/4/24   Ulisses Ventura MD        PAT ROS     DASI Risk Score    No data to display       Caprini DVT Assessment    No data to display       Modified Frailty Index    No data to display       CHADS2 Stroke Risk  Current as of 5 minutes ago        N/A 3 to 100%: High Risk   2 to < 3%: Medium Risk   0 to < 2%: Low Risk     Last Change: N/A          This  score determines the patient's risk of having a stroke if the patient has atrial fibrillation.        This score is not applicable to this patient. Components are not calculated.          Revised Cardiac Risk Index    No data to display       Apfel Simplified Score    No data to display       Risk Analysis Index Results This Encounter    No data found in the last 10 encounters.     After Visit Summary (AVS) reviewed and patient verbalized good understanding of medications and NPO instructions.       Nurse Plan of Action:

## 2024-10-30 ENCOUNTER — APPOINTMENT (OUTPATIENT)
Dept: UROLOGY | Facility: HOSPITAL | Age: 74
End: 2024-10-30
Payer: MEDICARE

## 2024-10-30 NOTE — PROGRESS NOTES
Primary Care Physician: Diony Tamayo MD  Date of Visit: 11/04/2024  3:00 PM EST  Location of visit: [address]     Chief Complaint:   1 yr follow up, pre-op eval     HPI / Summary:   José Miguel Lua is a 74 y.o. male  with HTN, dyslipidemia, non-insulin-dependent DM, CAD with CABG x 3(LIMA-LAD, rad-PLV, DAGMAR-PDA) 11/2019     His coronary disease initially presented with accelerating angina and high risk exercise treadmill test. Extreme exhaustion with minimal activity.        interval events:      No chest pain, dyspnea, palpitations, presyncope or syncope  He continues to exercise by walking several days a week. Able to go up about 4 flights before might need to rest.   Scheduled for Holep with urology next week. Here for pre-op eval as well.      Last Cardiology Tests:  ECG:  10/2024: NSR with PAC, RBBB  11/2023: NSR with HR 64 bpm, RBBB no significant change from prior 1 year ago     Echo:  ECHO 11/2019: LVEF 65%,, mid and apical inferior wall and mid inferolateral segment     Cath:  CARDIAC CATH 2019:  Coronary Lesion Summary:  Vessel       Stenosis   Vessel Segment  LAD        80% stenosis    proximal  LAD        90% stenosis proximal to mid  Circumflex 30% stenosis    proximal  RCA        99% stenosis    proximal  RPL        80% stenosis    proximal    Stress Test:      Cardiac Imaging:    Past Medical History:  Past Medical History:   Diagnosis Date    Anemia     15.4/46.4 1/5    Atherosclerotic heart disease of native coronary artery with unspecified angina pectoris 06/23/2021    Coronary artery disease involving native coronary artery of native heart with angina pectoris    BPH (benign prostatic hyperplasia)     Disease of thyroid gland     thyroid cancer    GERD (gastroesophageal reflux disease)     Hyperlipidemia     Hypertension     Hypocalcemia 12/16/2019    Hypocalcemia/ s/p parathyroidectomy of one nodule    Type 2 diabetes mellitus     a1c 6.6 7/18        Past Surgical History:  Past  Surgical History:   Procedure Laterality Date    OTHER SURGICAL HISTORY  05/11/2018    Parathyroid Surgery    OTHER SURGICAL HISTORY  12/29/2019    Coronary artery bypass graft/ cardiologist dr. young 11/16/23 f/u 1 year    TOTAL THYROIDECTOMY  10/22/2013    Thyroid Surgery Total Thyroidectomy          Social History:  He reports that he quit smoking about 41 years ago. His smoking use included cigarettes. He started smoking about 55 years ago. He has a 7 pack-year smoking history. He has never used smokeless tobacco. He reports current alcohol use of about 2.0 standard drinks of alcohol per week. He reports that he does not currently use drugs.    Family History:  family history includes Alcohol abuse in his father; Dementia in his mother; Nephrolithiasis in his brother; No Known Problems in his half-brother.      Allergies:  Allergies   Allergen Reactions    Lisinopril Cough       Outpatient Medications:  Current Outpatient Medications   Medication Instructions    acetaminophen (Tylenol) 325 mg tablet Every 6 hours PRN    aspirin 81 mg, Daily    atorvastatin (LIPITOR) 80 mg, oral, Nightly    blood sugar diagnostic (FreeStyle Lite Strips) strip TEST ONCE DAILY    calcium carbonate-vitamin D3 600 mg-20 mcg (800 unit) tablet 2 tablets, 2 times daily    cholecalciferol (VITAMIN D3) 1,000 Units, Daily    ciclopirox (Penlac) 8 % solution Nightly    ferrous sulfate, 325 mg ferrous sulfate, tablet 1 tablet, oral, Daily    FreeStyle Lancets 28 gauge TEST ONCE DAILY    Jardiance 25 mg, oral, Daily    metFORMIN (GLUCOPHAGE) 1,000 mg, oral, 2 times daily    nitroglycerin (NITROSTAT) 0.4 mg, Every 5 min PRN    SITagliptin phosphate (JANUVIA) 100 mg, oral, Daily    Synthroid 150 mcg, oral, Daily    tadalafil (CIALIS) 5 mg, oral, Daily       Physical Exam:  GENERAL: alert, cooperative, pleasant, in no acute distress  SKIN: warm, dry, no rash.  NECK: no JVD, no MIKE  CARDIAC: Regular rate and rhythm with no rubs, murmurs, or  "gallops  CHEST: Normal respiratory efforts, lungs clear to auscultation bilaterally.  ABDOMEN: soft, nontender, nondistended  EXTREMITIES: no edema  NEURO: Alert and oriented x 3.  Grossly normal.  Moves all 4 extremities.    Vitals:    11/04/24 1452   BP: 119/71   BP Location: Left arm   Patient Position: Sitting   Pulse: 69   SpO2: 98%   Weight: 89.8 kg (198 lb)     Wt Readings from Last 5 Encounters:   10/14/24 91 kg (200 lb 9.9 oz)   09/15/24 91.4 kg (201 lb 9.6 oz)   09/06/24 90.2 kg (198 lb 12.8 oz)   07/18/24 92.1 kg (203 lb)   05/17/24 83.5 kg (184 lb 1.4 oz)     Body mass index is 27.11 kg/m².        Last Labs:  CMP:  Recent Labs     10/14/24  1052 06/01/24  1134 01/05/24  1113    139 142   K 4.4 4.2 4.3    102 102   CO2 30 27 30   ANIONGAP 11 14 14   BUN 13 19 16   CREATININE 0.67 0.70 0.67   EGFR >90 >90 >90   GLUCOSE 137* 293* 126*     Recent Labs     06/01/24  1134 01/05/24  1113 01/09/23  0835 12/18/21  0800 09/05/20  1332 12/06/19  0824 11/14/19  0500 11/11/19  1255 11/04/19  1606   ALBUMIN 4.1  --   --   --  4.3  --  3.2*   < > 3.8   ALKPHOS  --   --   --   --  69  --   --   --  52   ALT  --  21 15 15 16   < >  --   --  16   AST  --  19 15 15 19   < >  --   --  19   BILITOT  --   --   --   --  0.6  --   --   --  0.5    < > = values in this interval not displayed.     CBC:  Recent Labs     10/14/24  1052 01/05/24  1113 07/05/23  1054   WBC 6.9 7.2 7.6   HGB 14.9 15.4 14.6   HCT 45.7 46.1 44.2    243 199   MCV 98 97 100     COAG:   Recent Labs     11/14/19  0500 11/13/19  0447 11/12/19  0433   INR 1.1 1.3* 1.3*     ENDO:  Recent Labs     09/06/24  1106 07/18/24  1036 01/05/24  1113 09/01/23  1113 07/05/23  1054 01/09/23  0835 09/02/22  1150 12/18/21  0800 11/05/21  1632   TSH 1.95  --   --  3.32  --   --  2.66  --  1.23   HGBA1C  --  6.6* 6.7*  --  6.8* 6.6*  --    < >  --     < > = values in this interval not displayed.      CARDIAC: No results for input(s): \"LDH\", \"CKMB\", \"TROPHS\", " "\"BNP\" in the last 35519 hours.    No lab exists for component: \"CK\", \"CKMBP\"  Recent Labs     01/05/24  1113 01/09/23  0835 12/18/21  0800 12/14/20  0826   CHOL 123 126 137 117   LDLF  --  70 78 59   LDLCALC 70  --   --   --    HDL 34.1 34.4* 35.3* 35.4*   TRIG 96 108 120 113             Assessment/Plan   74-year-old male with HTN, dyslipidemia, non-insulin-dependent DM, CAD with CABG x 3(LIMA-LAD, rad-PLV, DAGMAR-PDA) 11/2019 who is doing well over all with no interval cardiac events in the past year. Continue aspirin and statin. lipids well controlled.  Scheduled for urology procedure next week.  He has no ischemic signs or symptoms.  Exercise capacity greater than 4 METS.  Overall low to moderate risk and may proceed with HOLEP as scheduled.  Continue aspirin and statin.  I will plan to follow-up with him in about 1 year or sooner should the need arise.        Followup Appts:  Future Appointments   Date Time Provider Department Center   11/13/2024  9:40 AM MD BRANDON Osorio [facility]   11/27/2024 10:10 AM MD BRANDON Osorio [facility]   12/6/2024 11:00 AM Ulisses Ventura MD JAKE [facility]   1/13/2025 10:00 AM Diony Tamayo MD [id number] [facility]   9/12/2025 10:15 AM Camelia Rodriguez MD [id number] None           ____________________________________________________________  Neri Larios DO  [facility]  [facility]  "

## 2024-10-30 NOTE — H&P (VIEW-ONLY)
Primary Care Physician: Diony Tamayo MD  Date of Visit: 11/04/2024  3:00 PM EST  Location of visit: [address]     Chief Complaint:   1 yr follow up, pre-op eval     HPI / Summary:   José Miguel Lua is a 74 y.o. male  with HTN, dyslipidemia, non-insulin-dependent DM, CAD with CABG x 3(LIMA-LAD, rad-PLV, DAGMAR-PDA) 11/2019     His coronary disease initially presented with accelerating angina and high risk exercise treadmill test. Extreme exhaustion with minimal activity.        interval events:      No chest pain, dyspnea, palpitations, presyncope or syncope  He continues to exercise by walking several days a week. Able to go up about 4 flights before might need to rest.   Scheduled for [facility]p with urology next week. Here for pre-op eval as well.      Last Cardiology Tests:  ECG:  10/2024: NSR with PAC, RBBB  11/2023: NSR with HR 64 bpm, RBBB no significant change from prior 1 year ago     Echo:  ECHO 11/2019: LVEF 65%,, mid and apical inferior wall and mid inferolateral segment     Cath:  CARDIAC CATH 2019:  Coronary Lesion Summary:  Vessel       Stenosis   Vessel Segment  LAD        80% stenosis    proximal  LAD        90% stenosis proximal to mid  Circumflex 30% stenosis    proximal  RCA        99% stenosis    proximal  RPL        80% stenosis    proximal    Stress Test:      Cardiac Imaging:    Past Medical History:  Past Medical History:   Diagnosis Date    Anemia     15.4/46.4 1/5    Atherosclerotic heart disease of native coronary artery with unspecified angina pectoris 06/23/2021    Coronary artery disease involving native coronary artery of native heart with angina pectoris    BPH (benign prostatic hyperplasia)     Disease of thyroid gland     thyroid cancer    GERD (gastroesophageal reflux disease)     Hyperlipidemia     Hypertension     Hypocalcemia 12/16/2019    Hypocalcemia/ s/p parathyroidectomy of one nodule    Type 2 diabetes mellitus     a1c 6.6 7/18        Past Surgical History:  Past  Surgical History:   Procedure Laterality Date    OTHER SURGICAL HISTORY  05/11/2018    Parathyroid Surgery    OTHER SURGICAL HISTORY  12/29/2019    Coronary artery bypass graft/ cardiologist dr. young 11/16/23 f/u 1 year    TOTAL THYROIDECTOMY  10/22/2013    Thyroid Surgery Total Thyroidectomy          Social History:  He reports that he quit smoking about 41 years ago. His smoking use included cigarettes. He started smoking about 55 years ago. He has a 7 pack-year smoking history. He has never used smokeless tobacco. He reports current alcohol use of about 2.0 standard drinks of alcohol per week. He reports that he does not currently use drugs.    Family History:  family history includes Alcohol abuse in his father; Dementia in his mother; Nephrolithiasis in his brother; No Known Problems in his half-brother.      Allergies:  Allergies   Allergen Reactions    Lisinopril Cough       Outpatient Medications:  Current Outpatient Medications   Medication Instructions    acetaminophen (Tylenol) 325 mg tablet Every 6 hours PRN    aspirin 81 mg, Daily    atorvastatin (LIPITOR) 80 mg, oral, Nightly    blood sugar diagnostic (FreeStyle Lite Strips) strip TEST ONCE DAILY    calcium carbonate-vitamin D3 600 mg-20 mcg (800 unit) tablet 2 tablets, 2 times daily    cholecalciferol (VITAMIN D3) 1,000 Units, Daily    ciclopirox (Penlac) 8 % solution Nightly    ferrous sulfate, 325 mg ferrous sulfate, tablet 1 tablet, oral, Daily    FreeStyle Lancets 28 gauge TEST ONCE DAILY    Jardiance 25 mg, oral, Daily    metFORMIN (GLUCOPHAGE) 1,000 mg, oral, 2 times daily    nitroglycerin (NITROSTAT) 0.4 mg, Every 5 min PRN    SITagliptin phosphate (JANUVIA) 100 mg, oral, Daily    Synthroid 150 mcg, oral, Daily    tadalafil (CIALIS) 5 mg, oral, Daily       Physical Exam:  GENERAL: alert, cooperative, pleasant, in no acute distress  SKIN: warm, dry, no rash.  NECK: no JVD, no MIKE  CARDIAC: Regular rate and rhythm with no rubs, murmurs, or  "gallops  CHEST: Normal respiratory efforts, lungs clear to auscultation bilaterally.  ABDOMEN: soft, nontender, nondistended  EXTREMITIES: no edema  NEURO: Alert and oriented x 3.  Grossly normal.  Moves all 4 extremities.    Vitals:    11/04/24 1452   BP: 119/71   BP Location: Left arm   Patient Position: Sitting   Pulse: 69   SpO2: 98%   Weight: 89.8 kg (198 lb)     Wt Readings from Last 5 Encounters:   10/14/24 91 kg (200 lb 9.9 oz)   09/15/24 91.4 kg (201 lb 9.6 oz)   09/06/24 90.2 kg (198 lb 12.8 oz)   07/18/24 92.1 kg (203 lb)   05/17/24 83.5 kg (184 lb 1.4 oz)     Body mass index is 27.11 kg/m².        Last Labs:  CMP:  Recent Labs     10/14/24  1052 06/01/24  1134 01/05/24  1113    139 142   K 4.4 4.2 4.3    102 102   CO2 30 27 30   ANIONGAP 11 14 14   BUN 13 19 16   CREATININE 0.67 0.70 0.67   EGFR >90 >90 >90   GLUCOSE 137* 293* 126*     Recent Labs     06/01/24  1134 01/05/24  1113 01/09/23  0835 12/18/21  0800 09/05/20  1332 12/06/19  0824 11/14/19  0500 11/11/19  1255 11/04/19  1606   ALBUMIN 4.1  --   --   --  4.3  --  3.2*   < > 3.8   ALKPHOS  --   --   --   --  69  --   --   --  52   ALT  --  21 15 15 16   < >  --   --  16   AST  --  19 15 15 19   < >  --   --  19   BILITOT  --   --   --   --  0.6  --   --   --  0.5    < > = values in this interval not displayed.     CBC:  Recent Labs     10/14/24  1052 01/05/24  1113 07/05/23  1054   WBC 6.9 7.2 7.6   HGB 14.9 15.4 14.6   HCT 45.7 46.1 44.2    243 199   MCV 98 97 100     COAG:   Recent Labs     11/14/19  0500 11/13/19  0447 11/12/19  0433   INR 1.1 1.3* 1.3*     ENDO:  Recent Labs     09/06/24  1106 07/18/24  1036 01/05/24  1113 09/01/23  1113 07/05/23  1054 01/09/23  0835 09/02/22  1150 12/18/21  0800 11/05/21  1632   TSH 1.95  --   --  3.32  --   --  2.66  --  1.23   HGBA1C  --  6.6* 6.7*  --  6.8* 6.6*  --    < >  --     < > = values in this interval not displayed.      CARDIAC: No results for input(s): \"LDH\", \"CKMB\", \"TROPHS\", " "\"BNP\" in the last 97035 hours.    No lab exists for component: \"CK\", \"CKMBP\"  Recent Labs     01/05/24  1113 01/09/23  0835 12/18/21  0800 12/14/20  0826   CHOL 123 126 137 117   LDLF  --  70 78 59   LDLCALC 70  --   --   --    HDL 34.1 34.4* 35.3* 35.4*   TRIG 96 108 120 113             Assessment/Plan   74-year-old male with HTN, dyslipidemia, non-insulin-dependent DM, CAD with CABG x 3(LIMA-LAD, rad-PLV, DAGMAR-PDA) 11/2019 who is doing well over all with no interval cardiac events in the past year. Continue aspirin and statin. lipids well controlled.  Scheduled for urology procedure next week.  He has no ischemic signs or symptoms.  Exercise capacity greater than 4 METS.  Overall low to moderate risk and may proceed with HOLEP as scheduled.  Continue aspirin and statin.  I will plan to follow-up with him in about 1 year or sooner should the need arise.        Followup Appts:  Future Appointments   Date Time Provider Department Center   11/13/2024  9:40 AM MD BRANDON Osorio [facility]   11/27/2024 10:10 AM MD BRANDON Osorio [facility]   12/6/2024 11:00 AM Ulisses Ventura MD JAKE [facility]   1/13/2025 10:00 AM Diony Tamayo MD [id number] [facility]   9/12/2025 10:15 AM Camelia Rodriguez MD [id number] None           ____________________________________________________________  Neri Larios DO  [facility]  [facility]  "

## 2024-11-04 ENCOUNTER — OFFICE VISIT (OUTPATIENT)
Dept: CARDIOLOGY | Facility: CLINIC | Age: 74
End: 2024-11-04
Payer: MEDICARE

## 2024-11-04 VITALS
BODY MASS INDEX: 27.11 KG/M2 | SYSTOLIC BLOOD PRESSURE: 119 MMHG | DIASTOLIC BLOOD PRESSURE: 71 MMHG | WEIGHT: 198 LBS | HEART RATE: 69 BPM | OXYGEN SATURATION: 98 %

## 2024-11-04 DIAGNOSIS — E78.2 MIXED HYPERLIPIDEMIA: ICD-10-CM

## 2024-11-04 DIAGNOSIS — Z01.810 PRE-OPERATIVE CARDIOVASCULAR EXAMINATION: ICD-10-CM

## 2024-11-04 DIAGNOSIS — Z95.1 S/P CABG X 3: Primary | ICD-10-CM

## 2024-11-04 PROCEDURE — 3078F DIAST BP <80 MM HG: CPT | Performed by: INTERNAL MEDICINE

## 2024-11-04 PROCEDURE — 1123F ACP DISCUSS/DSCN MKR DOCD: CPT | Performed by: INTERNAL MEDICINE

## 2024-11-04 PROCEDURE — 1036F TOBACCO NON-USER: CPT | Performed by: INTERNAL MEDICINE

## 2024-11-04 PROCEDURE — 99214 OFFICE O/P EST MOD 30 MIN: CPT | Performed by: INTERNAL MEDICINE

## 2024-11-04 PROCEDURE — 1157F ADVNC CARE PLAN IN RCRD: CPT | Performed by: INTERNAL MEDICINE

## 2024-11-04 PROCEDURE — 3074F SYST BP LT 130 MM HG: CPT | Performed by: INTERNAL MEDICINE

## 2024-11-04 PROCEDURE — 1159F MED LIST DOCD IN RCRD: CPT | Performed by: INTERNAL MEDICINE

## 2024-11-04 PROCEDURE — 1126F AMNT PAIN NOTED NONE PRSNT: CPT | Performed by: INTERNAL MEDICINE

## 2024-11-04 PROCEDURE — G2211 COMPLEX E/M VISIT ADD ON: HCPCS | Performed by: INTERNAL MEDICINE

## 2024-11-04 ASSESSMENT — COLUMBIA-SUICIDE SEVERITY RATING SCALE - C-SSRS
2. HAVE YOU ACTUALLY HAD ANY THOUGHTS OF KILLING YOURSELF?: NO
6. HAVE YOU EVER DONE ANYTHING, STARTED TO DO ANYTHING, OR PREPARED TO DO ANYTHING TO END YOUR LIFE?: NO
1. IN THE PAST MONTH, HAVE YOU WISHED YOU WERE DEAD OR WISHED YOU COULD GO TO SLEEP AND NOT WAKE UP?: NO

## 2024-11-04 ASSESSMENT — PAIN SCALES - GENERAL: PAINLEVEL_OUTOF10: 0-NO PAIN

## 2024-11-07 DIAGNOSIS — E11.69 TYPE 2 DIABETES MELLITUS WITH HYPERLIPIDEMIA (MULTI): ICD-10-CM

## 2024-11-07 DIAGNOSIS — E78.5 TYPE 2 DIABETES MELLITUS WITH HYPERLIPIDEMIA (MULTI): ICD-10-CM

## 2024-11-07 RX ORDER — BLOOD-GLUCOSE METER
KIT MISCELLANEOUS
Qty: 100 STRIP | Refills: 3 | Status: SHIPPED | OUTPATIENT
Start: 2024-11-07

## 2024-11-10 ENCOUNTER — ANESTHESIA EVENT (OUTPATIENT)
Dept: OPERATING ROOM | Facility: HOSPITAL | Age: 74
End: 2024-11-10
Payer: MEDICARE

## 2024-11-11 ENCOUNTER — HOSPITAL ENCOUNTER (OUTPATIENT)
Facility: HOSPITAL | Age: 74
Setting detail: OUTPATIENT SURGERY
Discharge: HOME | End: 2024-11-11
Attending: UROLOGY | Admitting: UROLOGY
Payer: MEDICARE

## 2024-11-11 ENCOUNTER — ANESTHESIA (OUTPATIENT)
Dept: OPERATING ROOM | Facility: HOSPITAL | Age: 74
End: 2024-11-11
Payer: MEDICARE

## 2024-11-11 VITALS
HEART RATE: 63 BPM | SYSTOLIC BLOOD PRESSURE: 141 MMHG | RESPIRATION RATE: 11 BRPM | BODY MASS INDEX: 29.14 KG/M2 | WEIGHT: 215.17 LBS | HEIGHT: 72 IN | OXYGEN SATURATION: 100 % | DIASTOLIC BLOOD PRESSURE: 77 MMHG | TEMPERATURE: 97.5 F

## 2024-11-11 DIAGNOSIS — N40.1 BPH WITH OBSTRUCTION/LOWER URINARY TRACT SYMPTOMS: Primary | ICD-10-CM

## 2024-11-11 DIAGNOSIS — R33.9 URINARY RETENTION: ICD-10-CM

## 2024-11-11 DIAGNOSIS — N13.8 BPH WITH OBSTRUCTION/LOWER URINARY TRACT SYMPTOMS: Primary | ICD-10-CM

## 2024-11-11 LAB — GLUCOSE BLD MANUAL STRIP-MCNC: 149 MG/DL (ref 74–99)

## 2024-11-11 PROCEDURE — 82947 ASSAY GLUCOSE BLOOD QUANT: CPT

## 2024-11-11 PROCEDURE — 3700000002 HC GENERAL ANESTHESIA TIME - EACH INCREMENTAL 1 MINUTE: Performed by: UROLOGY

## 2024-11-11 PROCEDURE — 2500000001 HC RX 250 WO HCPCS SELF ADMINISTERED DRUGS (ALT 637 FOR MEDICARE OP): Performed by: ANESTHESIOLOGY

## 2024-11-11 PROCEDURE — 52649 PROSTATE LASER ENUCLEATION: CPT | Performed by: UROLOGY

## 2024-11-11 PROCEDURE — 7100000002 HC RECOVERY ROOM TIME - EACH INCREMENTAL 1 MINUTE: Performed by: UROLOGY

## 2024-11-11 PROCEDURE — 3700000001 HC GENERAL ANESTHESIA TIME - INITIAL BASE CHARGE: Performed by: UROLOGY

## 2024-11-11 PROCEDURE — 7100000001 HC RECOVERY ROOM TIME - INITIAL BASE CHARGE: Performed by: UROLOGY

## 2024-11-11 PROCEDURE — C1889 IMPLANT/INSERT DEVICE, NOC: HCPCS | Performed by: UROLOGY

## 2024-11-11 PROCEDURE — 99100 ANES PT EXTEME AGE<1 YR&>70: CPT | Performed by: ANESTHESIOLOGY

## 2024-11-11 PROCEDURE — 2720000007 HC OR 272 NO HCPCS: Performed by: UROLOGY

## 2024-11-11 PROCEDURE — 7100000009 HC PHASE TWO TIME - INITIAL BASE CHARGE: Performed by: UROLOGY

## 2024-11-11 PROCEDURE — A52649 PR LASER ENUCLEATION PROSTATE W MORCELLATION: Performed by: NURSE ANESTHETIST, CERTIFIED REGISTERED

## 2024-11-11 PROCEDURE — 3600000009 HC OR TIME - EACH INCREMENTAL 1 MINUTE - PROCEDURE LEVEL FOUR: Performed by: UROLOGY

## 2024-11-11 PROCEDURE — 88307 TISSUE EXAM BY PATHOLOGIST: CPT | Mod: TC,AHULAB | Performed by: UROLOGY

## 2024-11-11 PROCEDURE — 2500000004 HC RX 250 GENERAL PHARMACY W/ HCPCS (ALT 636 FOR OP/ED): Performed by: NURSE ANESTHETIST, CERTIFIED REGISTERED

## 2024-11-11 PROCEDURE — C1758 CATHETER, URETERAL: HCPCS | Performed by: UROLOGY

## 2024-11-11 PROCEDURE — 3600000004 HC OR TIME - INITIAL BASE CHARGE - PROCEDURE LEVEL FOUR: Performed by: UROLOGY

## 2024-11-11 PROCEDURE — 7100000010 HC PHASE TWO TIME - EACH INCREMENTAL 1 MINUTE: Performed by: UROLOGY

## 2024-11-11 PROCEDURE — 2500000005 HC RX 250 GENERAL PHARMACY W/O HCPCS: Performed by: UROLOGY

## 2024-11-11 PROCEDURE — A52649 PR LASER ENUCLEATION PROSTATE W MORCELLATION: Performed by: ANESTHESIOLOGY

## 2024-11-11 RX ORDER — ACETAMINOPHEN 325 MG/1
650 TABLET ORAL EVERY 4 HOURS PRN
Status: CANCELLED | OUTPATIENT
Start: 2024-11-11

## 2024-11-11 RX ORDER — DOCUSATE SODIUM 100 MG/1
100 CAPSULE, LIQUID FILLED ORAL 2 TIMES DAILY
Qty: 30 CAPSULE | Refills: 0 | Status: SHIPPED | OUTPATIENT
Start: 2024-11-11

## 2024-11-11 RX ORDER — LIDOCAINE HYDROCHLORIDE 10 MG/ML
0.1 INJECTION, SOLUTION EPIDURAL; INFILTRATION; INTRACAUDAL; PERINEURAL ONCE
Status: CANCELLED | OUTPATIENT
Start: 2024-11-11 | End: 2024-11-11

## 2024-11-11 RX ORDER — CEFAZOLIN SODIUM 2 G/100ML
2 INJECTION, SOLUTION INTRAVENOUS ONCE
Status: DISCONTINUED | OUTPATIENT
Start: 2024-11-11 | End: 2024-11-11 | Stop reason: HOSPADM

## 2024-11-11 RX ORDER — SODIUM CHLORIDE, SODIUM LACTATE, POTASSIUM CHLORIDE, CALCIUM CHLORIDE 600; 310; 30; 20 MG/100ML; MG/100ML; MG/100ML; MG/100ML
100 INJECTION, SOLUTION INTRAVENOUS CONTINUOUS
Status: DISCONTINUED | OUTPATIENT
Start: 2024-11-11 | End: 2024-11-11 | Stop reason: HOSPADM

## 2024-11-11 RX ORDER — LIDOCAINE HYDROCHLORIDE 10 MG/ML
0.1 INJECTION, SOLUTION EPIDURAL; INFILTRATION; INTRACAUDAL; PERINEURAL ONCE
Status: DISCONTINUED | OUTPATIENT
Start: 2024-11-11 | End: 2024-11-11 | Stop reason: HOSPADM

## 2024-11-11 RX ORDER — OXYCODONE HYDROCHLORIDE 5 MG/1
5 TABLET ORAL EVERY 4 HOURS PRN
Status: CANCELLED | OUTPATIENT
Start: 2024-11-11

## 2024-11-11 RX ORDER — ONDANSETRON HYDROCHLORIDE 2 MG/ML
INJECTION, SOLUTION INTRAVENOUS AS NEEDED
Status: DISCONTINUED | OUTPATIENT
Start: 2024-11-11 | End: 2024-11-11

## 2024-11-11 RX ORDER — LIDOCAINE HYDROCHLORIDE 20 MG/ML
INJECTION, SOLUTION INFILTRATION; PERINEURAL AS NEEDED
Status: DISCONTINUED | OUTPATIENT
Start: 2024-11-11 | End: 2024-11-11

## 2024-11-11 RX ORDER — PROPOFOL 10 MG/ML
INJECTION, EMULSION INTRAVENOUS AS NEEDED
Status: DISCONTINUED | OUTPATIENT
Start: 2024-11-11 | End: 2024-11-11

## 2024-11-11 RX ORDER — CEFAZOLIN 1 G/1
INJECTION, POWDER, FOR SOLUTION INTRAVENOUS AS NEEDED
Status: DISCONTINUED | OUTPATIENT
Start: 2024-11-11 | End: 2024-11-11

## 2024-11-11 RX ORDER — OXYCODONE HYDROCHLORIDE 5 MG/1
5 TABLET ORAL EVERY 6 HOURS PRN
Qty: 4 TABLET | Refills: 0 | Status: SHIPPED | OUTPATIENT
Start: 2024-11-11 | End: 2024-11-14

## 2024-11-11 RX ORDER — FENTANYL CITRATE 50 UG/ML
INJECTION, SOLUTION INTRAMUSCULAR; INTRAVENOUS AS NEEDED
Status: DISCONTINUED | OUTPATIENT
Start: 2024-11-11 | End: 2024-11-11

## 2024-11-11 RX ORDER — ACETAMINOPHEN 325 MG/1
650 TABLET ORAL EVERY 4 HOURS PRN
Status: DISCONTINUED | OUTPATIENT
Start: 2024-11-11 | End: 2024-11-11 | Stop reason: HOSPADM

## 2024-11-11 RX ORDER — PHENAZOPYRIDINE HYDROCHLORIDE 100 MG/1
100 TABLET, FILM COATED ORAL 3 TIMES DAILY PRN
Qty: 15 TABLET | Refills: 0 | Status: SHIPPED | OUTPATIENT
Start: 2024-11-11

## 2024-11-11 RX ORDER — SULFAMETHOXAZOLE AND TRIMETHOPRIM 800; 160 MG/1; MG/1
1 TABLET ORAL 2 TIMES DAILY
Qty: 6 TABLET | Refills: 0 | Status: SHIPPED | OUTPATIENT
Start: 2024-11-11 | End: 2024-11-14

## 2024-11-11 RX ORDER — SODIUM CHLORIDE, SODIUM LACTATE, POTASSIUM CHLORIDE, CALCIUM CHLORIDE 600; 310; 30; 20 MG/100ML; MG/100ML; MG/100ML; MG/100ML
100 INJECTION, SOLUTION INTRAVENOUS CONTINUOUS
Status: CANCELLED | OUTPATIENT
Start: 2024-11-11 | End: 2024-11-11

## 2024-11-11 RX ORDER — MIDAZOLAM HYDROCHLORIDE 1 MG/ML
INJECTION INTRAMUSCULAR; INTRAVENOUS AS NEEDED
Status: DISCONTINUED | OUTPATIENT
Start: 2024-11-11 | End: 2024-11-11

## 2024-11-11 RX ORDER — SODIUM CHLORIDE 0.9 G/100ML
IRRIGANT IRRIGATION AS NEEDED
Status: DISCONTINUED | OUTPATIENT
Start: 2024-11-11 | End: 2024-11-11 | Stop reason: HOSPADM

## 2024-11-11 RX ORDER — SODIUM CHLORIDE, SODIUM LACTATE, POTASSIUM CHLORIDE, CALCIUM CHLORIDE 600; 310; 30; 20 MG/100ML; MG/100ML; MG/100ML; MG/100ML
20 INJECTION, SOLUTION INTRAVENOUS CONTINUOUS
Status: DISCONTINUED | OUTPATIENT
Start: 2024-11-11 | End: 2024-11-11 | Stop reason: HOSPADM

## 2024-11-11 RX ORDER — OXYCODONE HYDROCHLORIDE 5 MG/1
5 TABLET ORAL EVERY 4 HOURS PRN
Status: DISCONTINUED | OUTPATIENT
Start: 2024-11-11 | End: 2024-11-11 | Stop reason: HOSPADM

## 2024-11-11 SDOH — HEALTH STABILITY: MENTAL HEALTH: CURRENT SMOKER: 0

## 2024-11-11 ASSESSMENT — PAIN - FUNCTIONAL ASSESSMENT
PAIN_FUNCTIONAL_ASSESSMENT: 0-10

## 2024-11-11 ASSESSMENT — COLUMBIA-SUICIDE SEVERITY RATING SCALE - C-SSRS
1. IN THE PAST MONTH, HAVE YOU WISHED YOU WERE DEAD OR WISHED YOU COULD GO TO SLEEP AND NOT WAKE UP?: NO
6. HAVE YOU EVER DONE ANYTHING, STARTED TO DO ANYTHING, OR PREPARED TO DO ANYTHING TO END YOUR LIFE?: NO
2. HAVE YOU ACTUALLY HAD ANY THOUGHTS OF KILLING YOURSELF?: NO

## 2024-11-11 ASSESSMENT — PAIN SCALES - GENERAL
PAINLEVEL_OUTOF10: 3
PAINLEVEL_OUTOF10: 0 - NO PAIN
PAINLEVEL_OUTOF10: 3
PAINLEVEL_OUTOF10: 0 - NO PAIN
PAINLEVEL_OUTOF10: 5 - MODERATE PAIN

## 2024-11-11 ASSESSMENT — PAIN DESCRIPTION - DESCRIPTORS
DESCRIPTORS: ACHING

## 2024-11-11 NOTE — PROGRESS NOTES
HPI    74 y.o. male being seen with the following problem list:    Problem list:  BPH with LUTS - urgency, frequency and borderline urinary retention  Hx of gross hematuria      Note per Dr. Ventura 12/15/23:  José Miguel Lua is a 73 y.o. male who is being seen today for FUV     Hx:  -gross hematuria  -CT urogram 6/9/23 unremarkable  -negative cystoscopy  -urinary urgency and NTF on daily dosing Cialis   ----------------------------------------------------------------------------  1/4/24 - here today to discuss outlet procedures due to hx of urinary symptoms. He has some retention along with some nocturia, not as bothersome or troublesome at this time. No red flag symptoms. No UTIs, hematuruia, stones. PVR last mo with Dr. Ventura 193cc     7/19/24 - Seen today for 6mo symptom check. PVR 298cc. Reports some intermittent urgency with incomplete emptying, sometimes double voiding. Overall not too bothered     8/28/24 - seen back for cysto. PVR after cysto 310cc    11/11/24 - S/p HoLEP Path pending    11/13/24 - Presents today for TOV. 200cc in, 50cc out. Only a minimal urge when buitrago removed. No issues since surgery       Current Medications:  Current Outpatient Medications   Medication Sig Dispense Refill    acetaminophen (Tylenol) 325 mg tablet Take by mouth every 6 hours if needed for mild pain (1 - 3).      aspirin 81 mg chewable tablet Chew 1 tablet (81 mg) once daily.      atorvastatin (Lipitor) 80 mg tablet TAKE 1 TABLET DAILY AT BEDTIME 90 tablet 3    blood sugar diagnostic (FreeStyle Lite Strips) strip TEST ONCE DAILY 100 strip 3    calcium carbonate-vitamin D3 600 mg-20 mcg (800 unit) tablet Take 2 tablets by mouth 2 times a day.      cholecalciferol (Vitamin D3) 25 MCG (1000 UT) tablet Take 1 tablet (1,000 Units) by mouth once daily. (Patient not taking: Reported on 11/11/2024)      ciclopirox (Penlac) 8 % solution Apply topically once daily at bedtime.      docusate sodium (Colace) 100 mg capsule Take 1  capsule (100 mg) by mouth 2 times a day. Hold for loose stools 30 capsule 0    ferrous sulfate, 325 mg ferrous sulfate, tablet Take 1 tablet by mouth once daily. 90 tablet 1    FreeStyle Lancets 28 gauge TEST ONCE DAILY 100 each 3    Jardiance 25 mg Take 1 tablet (25 mg) by mouth once daily. 90 tablet 1    metFORMIN (Glucophage) 1,000 mg tablet Take 1 tablet (1,000 mg) by mouth 2 times a day. 180 tablet 1    nitroglycerin (Nitrostat) 0.4 mg SL tablet Place 1 tablet (0.4 mg) under the tongue every 5 minutes if needed. (Patient not taking: Reported on 11/11/2024)      oxyCODONE (Roxicodone) 5 mg immediate release tablet Take 1 tablet (5 mg) by mouth every 6 hours if needed for severe pain (7 - 10) for up to 3 days. 4 tablet 0    phenazopyridine (Pyridium) 100 mg tablet Take 1 tablet (100 mg) by mouth 3 times a day as needed (burning). 15 tablet 0    SITagliptin phosphate (Januvia) 100 mg tablet Take 1 tablet (100 mg) by mouth once daily. 90 tablet 1    sulfamethoxazole-trimethoprim (Bactrim DS) 800-160 mg tablet Take 1 tablet by mouth 2 times a day for 3 days. 6 tablet 0    Synthroid 150 mcg tablet Take 1 tablet (150 mcg) by mouth once daily. 90 tablet 3     No current facility-administered medications for this visit.        Active Problems:  José Miguel Lua is a 74 y.o. male with the following Problems and Medications.  Patient Active Problem List   Diagnosis    Arteriosclerotic coronary artery disease    Cancer of thyroid (Multi)    Essential hypertension    Hypothyroidism    Iron deficiency anemia    Microalbuminuria due to type 2 diabetes mellitus (Multi)    S/P CABG x 3    Type 2 diabetes mellitus with hyperlipidemia (Multi)    Athscl heart disease of native coronary artery w/o ang pctrs    BPH with obstruction/lower urinary tract symptoms    Hyperlipidemia    Urinary retention     Current Outpatient Medications   Medication Sig Dispense Refill    acetaminophen (Tylenol) 325 mg tablet Take by mouth every 6 hours  if needed for mild pain (1 - 3).      aspirin 81 mg chewable tablet Chew 1 tablet (81 mg) once daily.      atorvastatin (Lipitor) 80 mg tablet TAKE 1 TABLET DAILY AT BEDTIME 90 tablet 3    blood sugar diagnostic (FreeStyle Lite Strips) strip TEST ONCE DAILY 100 strip 3    calcium carbonate-vitamin D3 600 mg-20 mcg (800 unit) tablet Take 2 tablets by mouth 2 times a day.      cholecalciferol (Vitamin D3) 25 MCG (1000 UT) tablet Take 1 tablet (1,000 Units) by mouth once daily. (Patient not taking: Reported on 11/11/2024)      ciclopirox (Penlac) 8 % solution Apply topically once daily at bedtime.      docusate sodium (Colace) 100 mg capsule Take 1 capsule (100 mg) by mouth 2 times a day. Hold for loose stools 30 capsule 0    ferrous sulfate, 325 mg ferrous sulfate, tablet Take 1 tablet by mouth once daily. 90 tablet 1    FreeStyle Lancets 28 gauge TEST ONCE DAILY 100 each 3    Jardiance 25 mg Take 1 tablet (25 mg) by mouth once daily. 90 tablet 1    metFORMIN (Glucophage) 1,000 mg tablet Take 1 tablet (1,000 mg) by mouth 2 times a day. 180 tablet 1    nitroglycerin (Nitrostat) 0.4 mg SL tablet Place 1 tablet (0.4 mg) under the tongue every 5 minutes if needed. (Patient not taking: Reported on 11/11/2024)      oxyCODONE (Roxicodone) 5 mg immediate release tablet Take 1 tablet (5 mg) by mouth every 6 hours if needed for severe pain (7 - 10) for up to 3 days. 4 tablet 0    phenazopyridine (Pyridium) 100 mg tablet Take 1 tablet (100 mg) by mouth 3 times a day as needed (burning). 15 tablet 0    SITagliptin phosphate (Januvia) 100 mg tablet Take 1 tablet (100 mg) by mouth once daily. 90 tablet 1    sulfamethoxazole-trimethoprim (Bactrim DS) 800-160 mg tablet Take 1 tablet by mouth 2 times a day for 3 days. 6 tablet 0    Synthroid 150 mcg tablet Take 1 tablet (150 mcg) by mouth once daily. 90 tablet 3     No current facility-administered medications for this visit.       PMH:  Past Medical History:   Diagnosis Date     Anemia     15.4/46.4     Atherosclerotic heart disease of native coronary artery with unspecified angina pectoris 2021    Coronary artery disease involving native coronary artery of native heart with angina pectoris    BPH (benign prostatic hyperplasia)     Disease of thyroid gland     thyroid cancer    GERD (gastroesophageal reflux disease)     Hyperlipidemia     Hypertension     Hypocalcemia 2019    Hypocalcemia/ s/p parathyroidectomy of one nodule    Type 2 diabetes mellitus     a1c 6.6 7/18       PSH:  Past Surgical History:   Procedure Laterality Date    OTHER SURGICAL HISTORY  2018    Parathyroid Surgery    OTHER SURGICAL HISTORY  2019    Coronary artery bypass graft/ cardiologist dr. young 23 f/u 1 year    TOTAL THYROIDECTOMY  10/22/2013    Thyroid Surgery Total Thyroidectomy       FMH:  Family History   Problem Relation Name Age of Onset    Dementia Mother      Alcohol abuse Father      Nephrolithiasis Brother      No Known Problems Half-Brother         SHx:  Social History     Tobacco Use    Smoking status: Former     Current packs/day: 0.00     Average packs/day: 0.5 packs/day for 14.0 years (7.0 ttl pk-yrs)     Types: Cigarettes     Start date:      Quit date:      Years since quittin.8    Smokeless tobacco: Never   Substance Use Topics    Alcohol use: Yes     Alcohol/week: 2.0 standard drinks of alcohol     Types: 2 Standard drinks or equivalent per week    Drug use: Not Currently       Allergies:  Allergies   Allergen Reactions    Lisinopril Cough       Physical Exam:      Assessment/Plan  Indeterminate TOV, suspect 2/2 inadequate filling. Will return in the afternoon for PVR check.     Scribe Attestation  By signing my name below, I, Christie Burnham, attest that this documentation  has been prepared under the direction and in the presence of Cole Multani MD. ,

## 2024-11-11 NOTE — ANESTHESIA POSTPROCEDURE EVALUATION
Patient: José Miguel Lua    Procedure Summary       Date: 11/11/24 Room / Location: [facility] / [facility]    Anesthesia Start: 1104 Anesthesia Stop: 1212    Procedure: Holmium Laser Enucleation Transurethral Prostate (Prostate) Diagnosis:       BPH with obstruction/lower urinary tract symptoms      (BPH with obstruction/lower urinary tract symptoms [N40.1, N13.8])    Surgeons: Cole Multani MD Responsible Provider: Abraham Spence MD    Anesthesia Type: general ASA Status: 2            Anesthesia Type: general    Vitals Value Taken Time   /77 11/11/24 1309   Temp 36.4 °C (97.5 °F) 11/11/24 1309   Pulse 63 11/11/24 1309   Resp 11 11/11/24 1309   SpO2 100 % 11/11/24 1309       Anesthesia Post Evaluation    Patient location during evaluation: PACU  Patient participation: complete - patient participated  Level of consciousness: awake  Pain management: adequate  Airway patency: patent  Cardiovascular status: acceptable  Respiratory status: acceptable  Hydration status: acceptable  Postoperative Nausea and Vomiting: none        No notable events documented.

## 2024-11-11 NOTE — ANESTHESIA PROCEDURE NOTES
Airway  Date/Time: 11/11/2024 11:16 AM  Urgency: elective    Airway not difficult    Staffing  Performed: SRNA   Authorized by: Abraham Spence MD    Performed by: REMEDIOS Rivers-MISHA  Patient location during procedure: OR    Indications and Patient Condition  Indications for airway management: anesthesia  Spontaneous Ventilation: absent  Sedation level: deep  Preoxygenated: yes  Patient position: sniffing  Mask difficulty assessment: 0 - not attempted    Final Airway Details  Final airway type: supraglottic airway      Successful airway: Size 4     Number of attempts at approach: 1    Additional Comments  I gel 4

## 2024-11-11 NOTE — ANESTHESIA PREPROCEDURE EVALUATION
Patient: José Miguel Lua    Procedure Information       Date/Time: 11/11/24 1130    Procedure: Holmium Laser Enucleation Transurethral Prostate (Prostate)    Location: [facility] / [facility]    Surgeons: Cole Multani MD            Relevant Problems   Cardiac   (+) Arteriosclerotic coronary artery disease   (+) Athscl heart disease of native coronary artery w/o ang pctrs   (+) Essential hypertension   (+) Hyperlipidemia      /Renal   (+) BPH with obstruction/lower urinary tract symptoms      Endocrine   (+) Cancer of thyroid (Multi)   (+) Hypothyroidism   (+) Microalbuminuria due to type 2 diabetes mellitus (Multi)   (+) Type 2 diabetes mellitus with hyperlipidemia (Multi)      Hematology   (+) Iron deficiency anemia       Clinical information reviewed:   Tobacco  Allergies  Meds   Med Hx  Surg Hx   Fam Hx  Soc Hx         Past Medical History:   Diagnosis Date    Anemia     15.4/46.4 1/5    Atherosclerotic heart disease of native coronary artery with unspecified angina pectoris 06/23/2021    Coronary artery disease involving native coronary artery of native heart with angina pectoris    BPH (benign prostatic hyperplasia)     Disease of thyroid gland     thyroid cancer    GERD (gastroesophageal reflux disease)     Hyperlipidemia     Hypertension     Hypocalcemia 12/16/2019    Hypocalcemia/ s/p parathyroidectomy of one nodule    Type 2 diabetes mellitus     a1c 6.6 7/18      Past Surgical History:   Procedure Laterality Date    OTHER SURGICAL HISTORY  05/11/2018    Parathyroid Surgery    OTHER SURGICAL HISTORY  12/29/2019    Coronary artery bypass graft/ cardiologist dr. young 11/16/23 f/u 1 year    TOTAL THYROIDECTOMY  10/22/2013    Thyroid Surgery Total Thyroidectomy     Social History     Tobacco Use    Smoking status: Former     Current packs/day: 0.00     Average packs/day: 0.5 packs/day for 14.0 years (7.0 ttl pk-yrs)     Types: Cigarettes     Start date: 1969     Quit date: 1983     Years  "since quittin.8    Smokeless tobacco: Never   Substance Use Topics    Alcohol use: Yes     Alcohol/week: 2.0 standard drinks of alcohol     Types: 2 Standard drinks or equivalent per week    Drug use: Not Currently      Current Outpatient Medications   Medication Instructions    acetaminophen (Tylenol) 325 mg tablet Every 6 hours PRN    aspirin 81 mg, Daily    atorvastatin (LIPITOR) 80 mg, oral, Nightly    blood sugar diagnostic (FreeStyle Lite Strips) strip TEST ONCE DAILY    calcium carbonate-vitamin D3 600 mg-20 mcg (800 unit) tablet 2 tablets, 2 times daily    cholecalciferol (VITAMIN D3) 1,000 Units, Daily    ciclopirox (Penlac) 8 % solution Nightly    ferrous sulfate, 325 mg ferrous sulfate, tablet 1 tablet, oral, Daily    FreeStyle Lancets 28 gauge TEST ONCE DAILY    Jardiance 25 mg, oral, Daily    metFORMIN (GLUCOPHAGE) 1,000 mg, oral, 2 times daily    nitroglycerin (NITROSTAT) 0.4 mg, Every 5 min PRN    SITagliptin phosphate (JANUVIA) 100 mg, oral, Daily    Synthroid 150 mcg, oral, Daily    tadalafil (CIALIS) 5 mg, oral, Daily      Allergies   Allergen Reactions    Lisinopril Cough        Chemistry    Lab Results   Component Value Date/Time     10/14/2024 1052    K 4.4 10/14/2024 1052     10/14/2024 1052    CO2 30 10/14/2024 1052    BUN 13 10/14/2024 1052    CREATININE 0.67 10/14/2024 1052    Lab Results   Component Value Date/Time    CALCIUM 8.7 10/14/2024 1052    ALKPHOS 69 2020 1332    AST 19 2024 1113    ALT 21 2024 1113    BILITOT 0.6 2020 1332          Lab Results   Component Value Date    HGBA1C 6.6 (H) 2024     Lab Results   Component Value Date/Time    WBC 6.9 10/14/2024 1052    HGB 14.9 10/14/2024 1052    HCT 45.7 10/14/2024 1052     10/14/2024 1052     Lab Results   Component Value Date/Time    PROTIME 12.4 2019 0500    INR 1.1 2019 0500     No results found for: \"ABORH\"  Encounter Date: 10/14/24   ECG 12 lead (Clinic Performed) "   Result Value    Ventricular Rate 69    Atrial Rate 69    KY Interval 130    QRS Duration 128    QT Interval 398    QTC Calculation(Bazett) 426    P Axis 41    R Axis 81    T Axis 27    QRS Count 11    Q Onset 222    P Onset 157    P Offset 203    T Offset 421    QTC Fredericia 417    Narrative    Sinus rhythm with Premature atrial complexes  Right bundle branch block  Abnormal ECG  When compared with ECG of 06-NOV-2023 16:00,  Premature atrial complexes are now Present  Confirmed by Agustín Colbert ([id number]) on 10/14/2024 12:21:22 PM     No results found for this or any previous visit from the past 1095 days.       Visit Vitals  BP (!) 155/97   Pulse 79   Temp 36.2 °C (97.2 °F) (Temporal)   Resp 18   Ht 1.829 m (6')   Wt 97.6 kg (215 lb 2.7 oz)   SpO2 97%   BMI 29.18 kg/m²   Smoking Status Former   BSA 2.23 m²     NPO/Void Status  Date of Last Liquid: 11/10/24  Time of Last Liquid: 2330  Date of Last Solid: 11/10/24  Time of Last Solid: 2000  Last Intake Type: Clear fluids        Physical Exam    Airway  Mallampati: II  TM distance: >3 FB  Neck ROM: full     Cardiovascular - normal exam     Dental - normal exam     Pulmonary - normal exam     Abdominal - normal exam             Anesthesia Plan    History of general anesthesia?: yes  History of complications of general anesthesia?: no    ASA 2     general     The patient is not a current smoker.    intravenous induction   Postoperative administration of opioids is intended.  Anesthetic plan and risks discussed with patient.  Use of blood products discussed with patient who.    Plan discussed with CRNA and attending.

## 2024-11-11 NOTE — DISCHARGE INSTRUCTIONS
DEPARTMENT OF UROLOGY  DISCHARGE INSTRUCTIONS -- Holmium Laser   Outpatient Surgery    C O N F I D E N T I A L   I N F O R M A T I O N    José Miguel Lua        Call [phone] during regular daytime business hours (8:00 am - 5:00 pm) and after 5:00 pm and ask for the Urology resident with any questions or concerns.      If it is a life-threatening situation, proceed to the nearest emergency department.        Follow-up appointment:  trial of void, as per schedule     Thank you for the opportunity to care for you today.  Your health and healing are very important to us.  We hope we made you feel as comfortable as possible and are committed to your recovery and continued well-being.      The following is a brief overview of your transurethral prostate resection today. Some of the information contained on this summary may be confidential.  This information should be kept in your records and should be shared with your regular doctor.    Physicians:   Dr. Multani      Procedure performed: Prostate Resection  Pending results:   pathology of tissue taken from your prostate    What to Expect During your Recovery and Home Care  Anesthesia Side Effects   You received general anesthesia today.  You may feel sleepy, tired, or have a sore throat.   You may also feel drowsiness, dizziness, or inability to think clearly.  For your safety, do not drive, drink alcoholic beverages, take any unprescribed medication or make any important decisions for 24 hours.  A responsible adult should be with you for 24 hours.        Activity and Recovery    No heavy lifting over ten pounds x2 weeks. Limit activity while urinary catheter is in place. Avoid activities that would cause pulling or tugging on your catheter.    Do not drive or operate heavy machinery while taking narcotic pain medications as these medications can alter perception, impair judgement, and slow reaction times.    Pain Control  Unfortunately, you may experience pain after  your procedure.  Adequate management can include alternative measures to help ease your pain and can include over the counter Tylenol or Advil can be taken as prescribed as needed for breakthrough pain. Do not take more than 4,000mg of Tylenol in a 24-hour period.      You may also experience bladder spasms due to the catheter.     Nausea/Vomiting   Clear liquids are best tolerated at first. Start slow, advance your diet as tolerated to normal foods. Avoid spicy, greasy, heavy foods at first. Also, you may feel nauseous or like you need to vomit if you take any type of medication on an empty stomach.  Call your physician if you are unable to eat or drink and have persistent vomiting.    Signs of Bleeding   You are going to have some blood in your urine. Your urine will be light pink to yellow. You always want to look at the urine in the tubing of your catheter and not in the large urine collection bag to check for bleeding. If urine becomes thick dark red, has large clots or stops draining, please notify your physician.    Treatment/wound care:   Keep area(s) clean and dry. Clean around the tip or your penis were the catheter goes in daily with mild soap and water.  It is okay to shower 24 hours after time of surgery.    Do not submerge your catheter in standing water until seen for follow up appointment (no tub bathing, swimming, or hot tubs).      Signs of Infection  Signs of infection can include fever, drainage(green/yellow), chills, burning sensation with passing of urine, catheter leakage, or severe abdominal pain.  If you see any of these occur, please contact your doctor's office at [phone].  Any fever higher than 100.4, especially if associated with an ill feeling, abdominal pain, chills, or nausea should be reported to your surgeon.      Assist in bowel movements/urination  Increase fiber in diet  Increase water (6 to 8 glasses)  Increase walking     Additional Instructions: CATHETER CARE  Always keep  the catheters tubing and drainage bag below the level of your bladder.  Avoid loops and kinks in the catheter tubing.  NOTIFY your physician if catheter falls out or catheter seems clogged and urine is not draining.   Do not wear the small leg bag to bed you should be provided with a larger overnight bag that you should wear to bed and can hang over the side of the bed.  We recommend wearing the large bag in the shower, as this is easy to dry, and you do not get your leg straps wet from your leg bag.   Your catheter should be secured to your upper thigh, do not allow it to hang or dangle.  Your catheter will be removed at your post-operative appointment.

## 2024-11-11 NOTE — OP NOTE
Holmium Laser Enucleation Transurethral Prostate Operative Note     Date: 2024  OR Location: [facility]    Name: José Miguel Lua, : 1950, Age: 74 y.o., MRN: [id number], Sex: male    Diagnosis  Pre-op Diagnosis      * BPH with obstruction/lower urinary tract symptoms [N40.1, N13.8] Post-op Diagnosis     * BPH with obstruction/lower urinary tract symptoms [N40.1, N13.8]     Procedures  Holmium Laser Enucleation Transurethral Prostate  [id number] - MD LASER ENUCLEATION PROSTATE W/MORCELLATION      Surgeons      * Cole Multani - Primary    Resident/Fellow/Other Assistant:  Surgeons and Role:  * No surgeons found with a matching role *    Staff:   Circulator: Daja Benton Person: Ashley Corado Circulator: Mohini Corado Scrub: Ekta    Anesthesia Staff: Anesthesiologist: Abraham Spence MD  CRNA: REMEDIOS Rivers-MISHA  SRNA: Haily Law    Procedure Summary  Anesthesia: General  ASA: II  Estimated Blood Loss: 10mL  Intra-op Medications:   Administrations occurring from 1130 to 1330 on 24:   Medication Name Total Dose   sodium chloride 0.9 % irrigation solution 10,000 mL   fentaNYL (Sublimaze) injection 50 mcg/mL 50 mcg   ondansetron (Zofran) 2 mg/mL injection 4 mg              Anesthesia Record               Intraprocedure I/O Totals       None           Specimen:   ID Type Source Tests Collected by Time   1 : Prostate Chips Tissue PROSTATE HOLEP SURGICAL PATHOLOGY EXAM Daja Werner RN 2024 1138                 Drains and/or Catheters:   Urethral Catheter 22 Fr. (Active)   Site Assessment Clean;Skin intact 24 1209   Collection Container Standard drainage bag 24 1209   Securement Method Securing device (Describe) 24 1209       Tourniquet Times:         Implants:     Findings: obstructive trilobar growth, dominant median lobe    Indications: José Miguel Lua is an 74 y.o. male who is having surgery for BPH with obstruction/lower urinary tract symptoms [N40.1, N13.8].      The patient was seen in the preoperative area. The risks, benefits, complications, treatment options, non-operative alternatives, expected recovery and outcomes were discussed with the patient. The possibilities of reaction to medication, pulmonary aspiration, injury to surrounding structures, bleeding, recurrent infection, the need for additional procedures, failure to diagnose a condition, and creating a complication requiring transfusion or operation were discussed with the patient. The patient concurred with the proposed plan, giving informed consent.  The site of surgery was properly noted/marked if necessary per policy. The patient has been actively warmed in preoperative area. Preoperative antibiotics have been ordered and given within 1 hours of incision. Venous thrombosis prophylaxis have been ordered including bilateral sequential compression devices    Procedure Details:   1. HOLMIUM LASER ENUCLEATION OF THE PROSTATE       Laser Settings Used:  Cutting 2 J, 40 Hz.  Coagulation 1.5 J, 30 Hz.   Anthony or Virtual basket used? Anthony  Preoperative Prostate Size:  approx 60 cubic centimeters.     Prostate configuration: trilobar  Complication: none  EBL: 5 ml  Catheter: 22Fr 3 way  Antibiotics: ancef  Specimen: Morcellated prostatic tissue.    DESCRIPTION OF PROCEDURE:      The patient was brought to the OR and after good general anesthesia was achieved, the patient was prepped and draped in dorsal lithotomy position. All pressure points were padded.  Surgical pause was performed.  The patient was identified using 2 identifiers.  The correct surgical procedure was confirmed.  All members of surgical and anesthesia team were in agreement.  The patient received prophylactic antibiotic and the procedure started.    Cystoscopy: The patient's bladder was first entered with a 22-Croatian rigid cystoscope with 30-degree lens.  Cystoscopic examination showed normal anterior urethra.  The posterior urethra showed  trilobar obstruction with a dominant median lobe.  Both ureteral orifices were identified away from the bladder neck. The cystoscope was removed and the meatus was dilated up to 28-Bahraini using sounds.  The urethra was then filled with lidocaine gel and a 26-Bahraini Gusman continuous-flow resectoscope was placed.  The holmium laser apparatus was placed through the sheath.  Using a 550-micron end-firing quartz laser fiber, a laser bridge, and a 7-Bahraini laser stabilizing catheter, the procedure was started with the above-mentioned laser setting.    A en bloc technique was performed with an initial incision at the apex and circumferentially using low energy.  We continued to develop our anterior plane at the apex, identifying the capsule and freeing up the anterior apex well within the sphincter. We then proceeded with our posterior dissection, developing the posterior space toward the bladder neck and continuing our incision laterally to 9 and 3:00.    Then we turned our attention to the lateral attachments of the prostate.  Using laser energy, taking care to avoid any damage to the sphincter, we connected our previously dissected anterior and posterior planes to completely liberate the apex of the prostate preserving the sphincter. We then continued our dissection circumferentially, freeing the prostate systematically from apex to its attachments at the bladder neck. The adenoma was then pushed into the bladder.     Once the prostate had been fully enucleated, the laser was defocused on any sources of bleeding to get additional hemostasis.  There was good hemostasis at the conclusion of this procedure.  A few small remaining nodular adenomas were then resected from the capsule.      The laser bridge apparatus and the resectoscope were then removed and the offset Gusman nephroscope and Gusman - Beulah tissue morcellator were then introduced and used to completely morcellate the tissue.  Two inflows were used during this  portion of the procedure to fully distend the bladder and to prevent any inadvertent bladder injury.  The bladder was then ellik'd out after insertion of the inner sheath and reinspected with the cystoscope showing no residual adenomas.  Hemostasis was ensured at the conclusion of the procedure and both ureteral orifices were confirmed and identified well away from the area of resection.  No residual adenoma could be identified.     Following that, a three-way Hawley catheter on a guide was placed into the bladder and the balloon was filled.  The bladder was irrigated near clear and the continuous irrigation was started.     The patient tolerated the procedure well and was shifted to recovery in good general condition    Complications:  None; patient tolerated the procedure well.    Disposition: PACU - hemodynamically stable.  Condition: stable                 Additional Details:     Attending Attestation:     Cole Multani  Phone Number: [phone]

## 2024-11-11 NOTE — ANESTHESIA PROCEDURE NOTES
Airway  Date/Time: 11/11/2024 11:16 AM  Urgency: elective    Airway not difficult    Staffing  Performed: CRISTOPHER   Authorized by: Abraham Spence MD    Performed by: REMEDIOS Rivers-MISHA  Patient location during procedure: OR    Indications and Patient Condition  Indications for airway management: anesthesia  Spontaneous Ventilation: absent  Sedation level: deep  Preoxygenated: yes  Patient position: sniffing  MILS not maintained throughout  Mask difficulty assessment: 0 - not attempted  Planned trial extubation    Final Airway Details  Final airway type: supraglottic airway      Successful airway: Supraglottic airway: igel.  Size 4     Number of attempts at approach: 1

## 2024-11-12 ASSESSMENT — PAIN SCALES - GENERAL: PAINLEVEL_OUTOF10: 1

## 2024-11-13 ENCOUNTER — APPOINTMENT (OUTPATIENT)
Dept: UROLOGY | Facility: HOSPITAL | Age: 74
End: 2024-11-13
Payer: MEDICARE

## 2024-11-13 ENCOUNTER — OFFICE VISIT (OUTPATIENT)
Dept: UROLOGY | Facility: HOSPITAL | Age: 74
End: 2024-11-13
Payer: MEDICARE

## 2024-11-13 DIAGNOSIS — N40.1 BPH WITH OBSTRUCTION/LOWER URINARY TRACT SYMPTOMS: ICD-10-CM

## 2024-11-13 DIAGNOSIS — R33.9 URINARY RETENTION: ICD-10-CM

## 2024-11-13 DIAGNOSIS — N13.8 BPH WITH OBSTRUCTION/LOWER URINARY TRACT SYMPTOMS: ICD-10-CM

## 2024-11-13 DIAGNOSIS — R33.9 INCOMPLETE BLADDER EMPTYING: Primary | ICD-10-CM

## 2024-11-13 PROCEDURE — 1157F ADVNC CARE PLAN IN RCRD: CPT | Performed by: UROLOGY

## 2024-11-13 PROCEDURE — 99211 OFF/OP EST MAY X REQ PHY/QHP: CPT | Performed by: UROLOGY

## 2024-11-13 PROCEDURE — 1123F ACP DISCUSS/DSCN MKR DOCD: CPT | Performed by: UROLOGY

## 2024-11-15 ENCOUNTER — OFFICE VISIT (OUTPATIENT)
Dept: UROLOGY | Facility: HOSPITAL | Age: 74
End: 2024-11-15
Payer: MEDICARE

## 2024-11-15 DIAGNOSIS — R33.9 INCOMPLETE BLADDER EMPTYING: ICD-10-CM

## 2024-11-15 DIAGNOSIS — N40.1 BPH WITH OBSTRUCTION/LOWER URINARY TRACT SYMPTOMS: Primary | ICD-10-CM

## 2024-11-15 DIAGNOSIS — N13.8 BPH WITH OBSTRUCTION/LOWER URINARY TRACT SYMPTOMS: Primary | ICD-10-CM

## 2024-11-15 PROCEDURE — 1157F ADVNC CARE PLAN IN RCRD: CPT | Performed by: UROLOGY

## 2024-11-15 PROCEDURE — 51798 US URINE CAPACITY MEASURE: CPT | Performed by: UROLOGY

## 2024-11-15 PROCEDURE — 1123F ACP DISCUSS/DSCN MKR DOCD: CPT | Performed by: UROLOGY

## 2024-11-15 PROCEDURE — 1159F MED LIST DOCD IN RCRD: CPT | Performed by: UROLOGY

## 2024-11-15 PROCEDURE — 99211 OFF/OP EST MAY X REQ PHY/QHP: CPT | Performed by: UROLOGY

## 2024-11-15 PROCEDURE — 1036F TOBACCO NON-USER: CPT | Performed by: UROLOGY

## 2024-11-15 NOTE — PROGRESS NOTES
HPI    74 y.o. male being seen with the following problem list:    Problem list:  BPH with LUTS - urgency, frequency and borderline urinary retention  Hx of gross hematuria      Note per Dr. Ventura 12/15/23:  José Miguel Lua is a 73 y.o. male who is being seen today for FUV     Hx:  -gross hematuria  -CT urogram 6/9/23 unremarkable  -negative cystoscopy  -urinary urgency and NTF on daily dosing Cialis   ----------------------------------------------------------------------------  1/4/24 - here today to discuss outlet procedures due to hx of urinary symptoms. He has some retention along with some nocturia, not as bothersome or troublesome at this time. No red flag symptoms. No UTIs, hematuruia, stones. PVR last mo with Dr. Ventura 193cc     7/19/24 - Seen today for 6mo symptom check. PVR 298cc. Reports some intermittent urgency with incomplete emptying, sometimes double voiding. Overall not too bothered     8/28/24 - seen back for cysto. PVR after cysto 310cc     11/11/24 - S/p HoLEP Path pending     11/13/24 - Presents today for TOV. 200cc in, 50cc out. Only a minimal urge when buitrago removed. No issues since surgery    11/15/24 - PVR 256cc, feels like he is doing ok.       Current Medications:  Current Outpatient Medications   Medication Sig Dispense Refill    acetaminophen (Tylenol) 325 mg tablet Take by mouth every 6 hours if needed for mild pain (1 - 3).      aspirin 81 mg chewable tablet Chew 1 tablet (81 mg) once daily.      atorvastatin (Lipitor) 80 mg tablet TAKE 1 TABLET DAILY AT BEDTIME 90 tablet 3    blood sugar diagnostic (FreeStyle Lite Strips) strip TEST ONCE DAILY 100 strip 3    calcium carbonate-vitamin D3 600 mg-20 mcg (800 unit) tablet Take 2 tablets by mouth 2 times a day.      cholecalciferol (Vitamin D3) 25 MCG (1000 UT) tablet Take 1 tablet (1,000 Units) by mouth once daily. (Patient not taking: Reported on 11/11/2024)      ciclopirox (Penlac) 8 % solution Apply topically once daily at bedtime.       docusate sodium (Colace) 100 mg capsule Take 1 capsule (100 mg) by mouth 2 times a day. Hold for loose stools 30 capsule 0    ferrous sulfate, 325 mg ferrous sulfate, tablet Take 1 tablet by mouth once daily. 90 tablet 1    FreeStyle Lancets 28 gauge TEST ONCE DAILY 100 each 3    Jardiance 25 mg Take 1 tablet (25 mg) by mouth once daily. 90 tablet 1    metFORMIN (Glucophage) 1,000 mg tablet Take 1 tablet (1,000 mg) by mouth 2 times a day. 180 tablet 1    nitroglycerin (Nitrostat) 0.4 mg SL tablet Place 1 tablet (0.4 mg) under the tongue every 5 minutes if needed. (Patient not taking: Reported on 11/11/2024)      phenazopyridine (Pyridium) 100 mg tablet Take 1 tablet (100 mg) by mouth 3 times a day as needed (burning). 15 tablet 0    SITagliptin phosphate (Januvia) 100 mg tablet Take 1 tablet (100 mg) by mouth once daily. 90 tablet 1    Synthroid 150 mcg tablet Take 1 tablet (150 mcg) by mouth once daily. 90 tablet 3     No current facility-administered medications for this visit.        Active Problems:  José Miguel Lua is a 74 y.o. male with the following Problems and Medications.  Patient Active Problem List   Diagnosis    Arteriosclerotic coronary artery disease    Cancer of thyroid (Multi)    Essential hypertension    Hypothyroidism    Iron deficiency anemia    Microalbuminuria due to type 2 diabetes mellitus (Multi)    S/P CABG x 3    Type 2 diabetes mellitus with hyperlipidemia (Multi)    Athscl heart disease of native coronary artery w/o ang pctrs    BPH with obstruction/lower urinary tract symptoms    Hyperlipidemia    Urinary retention     Current Outpatient Medications   Medication Sig Dispense Refill    acetaminophen (Tylenol) 325 mg tablet Take by mouth every 6 hours if needed for mild pain (1 - 3).      aspirin 81 mg chewable tablet Chew 1 tablet (81 mg) once daily.      atorvastatin (Lipitor) 80 mg tablet TAKE 1 TABLET DAILY AT BEDTIME 90 tablet 3    blood sugar diagnostic (FreeStyle Lite Strips)  strip TEST ONCE DAILY 100 strip 3    calcium carbonate-vitamin D3 600 mg-20 mcg (800 unit) tablet Take 2 tablets by mouth 2 times a day.      cholecalciferol (Vitamin D3) 25 MCG (1000 UT) tablet Take 1 tablet (1,000 Units) by mouth once daily. (Patient not taking: Reported on 11/11/2024)      ciclopirox (Penlac) 8 % solution Apply topically once daily at bedtime.      docusate sodium (Colace) 100 mg capsule Take 1 capsule (100 mg) by mouth 2 times a day. Hold for loose stools 30 capsule 0    ferrous sulfate, 325 mg ferrous sulfate, tablet Take 1 tablet by mouth once daily. 90 tablet 1    FreeStyle Lancets 28 gauge TEST ONCE DAILY 100 each 3    Jardiance 25 mg Take 1 tablet (25 mg) by mouth once daily. 90 tablet 1    metFORMIN (Glucophage) 1,000 mg tablet Take 1 tablet (1,000 mg) by mouth 2 times a day. 180 tablet 1    nitroglycerin (Nitrostat) 0.4 mg SL tablet Place 1 tablet (0.4 mg) under the tongue every 5 minutes if needed. (Patient not taking: Reported on 11/11/2024)      phenazopyridine (Pyridium) 100 mg tablet Take 1 tablet (100 mg) by mouth 3 times a day as needed (burning). 15 tablet 0    SITagliptin phosphate (Januvia) 100 mg tablet Take 1 tablet (100 mg) by mouth once daily. 90 tablet 1    Synthroid 150 mcg tablet Take 1 tablet (150 mcg) by mouth once daily. 90 tablet 3     No current facility-administered medications for this visit.       PMH:  Past Medical History:   Diagnosis Date    Anemia     15.4/46.4 1/5    Atherosclerotic heart disease of native coronary artery with unspecified angina pectoris 06/23/2021    Coronary artery disease involving native coronary artery of native heart with angina pectoris    BPH (benign prostatic hyperplasia)     Disease of thyroid gland     thyroid cancer    GERD (gastroesophageal reflux disease)     Hyperlipidemia     Hypertension     Hypocalcemia 12/16/2019    Hypocalcemia/ s/p parathyroidectomy of one nodule    Type 2 diabetes mellitus     a1c 6.6 7/18        PSH:  Past Surgical History:   Procedure Laterality Date    OTHER SURGICAL HISTORY  2018    Parathyroid Surgery    OTHER SURGICAL HISTORY  2019    Coronary artery bypass graft/ cardiologist dr. young 23 f/u 1 year    TOTAL THYROIDECTOMY  10/22/2013    Thyroid Surgery Total Thyroidectomy       FMH:  Family History   Problem Relation Name Age of Onset    Dementia Mother      Alcohol abuse Father      Nephrolithiasis Brother      No Known Problems Half-Brother         SHx:  Social History     Tobacco Use    Smoking status: Former     Current packs/day: 0.00     Average packs/day: 0.5 packs/day for 14.0 years (7.0 ttl pk-yrs)     Types: Cigarettes     Start date:      Quit date:      Years since quittin.9    Smokeless tobacco: Never   Substance Use Topics    Alcohol use: Yes     Alcohol/week: 2.0 standard drinks of alcohol     Types: 2 Standard drinks or equivalent per week    Drug use: Not Currently       Allergies:  Allergies   Allergen Reactions    Lisinopril Cough     Assessment/Plan  S/p holep, only emptying marginally better. May be a bladder issue. Symptomatically appropriate for where he's at postop.    Will plan on cysto, pvr in 2 weeks to make sure there isnt an anatomic issue.

## 2024-11-15 NOTE — PROGRESS NOTES
HPI    74 y.o. male being seen with the following problem list:    Problem list:  BPH with LUTS - urgency, frequency and borderline urinary retention s/p HoLEP 11/11/24  Hx of gross hematuria     1/4/24 - here today to discuss outlet procedures due to hx of urinary symptoms. He has some retention along with some nocturia, not as bothersome or troublesome at this time. No red flag symptoms. No UTIs, hematuruia, stones. PVR last mo with Dr. Ventura 193cc     7/19/24 - Seen today for 6mo symptom check. PVR 298cc. Reports some intermittent urgency with incomplete emptying, sometimes double voiding. Overall not too bothered     8/28/24 - seen back for cysto. PVR after cysto 310cc     11/11/24 - S/p HoLEP Path pending     11/13/24 - Presents today for TOV. 200cc in, 50cc out. Only a minimal urge when buitrago removed. No issues since surgery    11/15/24 - PVR 256cc. Thinks his stream is reasonable, not markedly better. Nocturia 5x.     Current Medications:  Current Outpatient Medications   Medication Sig Dispense Refill    acetaminophen (Tylenol) 325 mg tablet Take by mouth every 6 hours if needed for mild pain (1 - 3).      aspirin 81 mg chewable tablet Chew 1 tablet (81 mg) once daily.      atorvastatin (Lipitor) 80 mg tablet TAKE 1 TABLET DAILY AT BEDTIME 90 tablet 3    blood sugar diagnostic (FreeStyle Lite Strips) strip TEST ONCE DAILY 100 strip 3    calcium carbonate-vitamin D3 600 mg-20 mcg (800 unit) tablet Take 2 tablets by mouth 2 times a day.      cholecalciferol (Vitamin D3) 25 MCG (1000 UT) tablet Take 1 tablet (1,000 Units) by mouth once daily. (Patient not taking: Reported on 11/11/2024)      ciclopirox (Penlac) 8 % solution Apply topically once daily at bedtime.      docusate sodium (Colace) 100 mg capsule Take 1 capsule (100 mg) by mouth 2 times a day. Hold for loose stools 30 capsule 0    ferrous sulfate, 325 mg ferrous sulfate, tablet Take 1 tablet by mouth once daily. 90 tablet 1    FreeStyle Lancets 28  gauge TEST ONCE DAILY 100 each 3    Jardiance 25 mg Take 1 tablet (25 mg) by mouth once daily. 90 tablet 1    metFORMIN (Glucophage) 1,000 mg tablet Take 1 tablet (1,000 mg) by mouth 2 times a day. 180 tablet 1    nitroglycerin (Nitrostat) 0.4 mg SL tablet Place 1 tablet (0.4 mg) under the tongue every 5 minutes if needed. (Patient not taking: Reported on 11/11/2024)      phenazopyridine (Pyridium) 100 mg tablet Take 1 tablet (100 mg) by mouth 3 times a day as needed (burning). 15 tablet 0    SITagliptin phosphate (Januvia) 100 mg tablet Take 1 tablet (100 mg) by mouth once daily. 90 tablet 1    Synthroid 150 mcg tablet Take 1 tablet (150 mcg) by mouth once daily. 90 tablet 3     No current facility-administered medications for this visit.        Active Problems:  José Miguel Lua is a 74 y.o. male with the following Problems and Medications.  Patient Active Problem List   Diagnosis    Arteriosclerotic coronary artery disease    Cancer of thyroid (Multi)    Essential hypertension    Hypothyroidism    Iron deficiency anemia    Microalbuminuria due to type 2 diabetes mellitus (Multi)    S/P CABG x 3    Type 2 diabetes mellitus with hyperlipidemia (Multi)    Athscl heart disease of native coronary artery w/o ang pctrs    BPH with obstruction/lower urinary tract symptoms    Hyperlipidemia    Urinary retention     Current Outpatient Medications   Medication Sig Dispense Refill    acetaminophen (Tylenol) 325 mg tablet Take by mouth every 6 hours if needed for mild pain (1 - 3).      aspirin 81 mg chewable tablet Chew 1 tablet (81 mg) once daily.      atorvastatin (Lipitor) 80 mg tablet TAKE 1 TABLET DAILY AT BEDTIME 90 tablet 3    blood sugar diagnostic (FreeStyle Lite Strips) strip TEST ONCE DAILY 100 strip 3    calcium carbonate-vitamin D3 600 mg-20 mcg (800 unit) tablet Take 2 tablets by mouth 2 times a day.      cholecalciferol (Vitamin D3) 25 MCG (1000 UT) tablet Take 1 tablet (1,000 Units) by mouth once daily.  (Patient not taking: Reported on 11/11/2024)      ciclopirox (Penlac) 8 % solution Apply topically once daily at bedtime.      docusate sodium (Colace) 100 mg capsule Take 1 capsule (100 mg) by mouth 2 times a day. Hold for loose stools 30 capsule 0    ferrous sulfate, 325 mg ferrous sulfate, tablet Take 1 tablet by mouth once daily. 90 tablet 1    FreeStyle Lancets 28 gauge TEST ONCE DAILY 100 each 3    Jardiance 25 mg Take 1 tablet (25 mg) by mouth once daily. 90 tablet 1    metFORMIN (Glucophage) 1,000 mg tablet Take 1 tablet (1,000 mg) by mouth 2 times a day. 180 tablet 1    nitroglycerin (Nitrostat) 0.4 mg SL tablet Place 1 tablet (0.4 mg) under the tongue every 5 minutes if needed. (Patient not taking: Reported on 11/11/2024)      phenazopyridine (Pyridium) 100 mg tablet Take 1 tablet (100 mg) by mouth 3 times a day as needed (burning). 15 tablet 0    SITagliptin phosphate (Januvia) 100 mg tablet Take 1 tablet (100 mg) by mouth once daily. 90 tablet 1    Synthroid 150 mcg tablet Take 1 tablet (150 mcg) by mouth once daily. 90 tablet 3     No current facility-administered medications for this visit.       PMH:  Past Medical History:   Diagnosis Date    Anemia     15.4/46.4 1/5    Atherosclerotic heart disease of native coronary artery with unspecified angina pectoris 06/23/2021    Coronary artery disease involving native coronary artery of native heart with angina pectoris    BPH (benign prostatic hyperplasia)     Disease of thyroid gland     thyroid cancer    GERD (gastroesophageal reflux disease)     Hyperlipidemia     Hypertension     Hypocalcemia 12/16/2019    Hypocalcemia/ s/p parathyroidectomy of one nodule    Type 2 diabetes mellitus     a1c 6.6 7/18       PSH:  Past Surgical History:   Procedure Laterality Date    OTHER SURGICAL HISTORY  05/11/2018    Parathyroid Surgery    OTHER SURGICAL HISTORY  12/29/2019    Coronary artery bypass graft/ cardiologist dr. young 11/16/23 f/u 1 year    TOTAL  THYROIDECTOMY  10/22/2013    Thyroid Surgery Total Thyroidectomy       FMH:  Family History   Problem Relation Name Age of Onset    Dementia Mother      Alcohol abuse Father      Nephrolithiasis Brother      No Known Problems Half-Brother         SHx:  Social History     Tobacco Use    Smoking status: Former     Current packs/day: 0.00     Average packs/day: 0.5 packs/day for 14.0 years (7.0 ttl pk-yrs)     Types: Cigarettes     Start date:      Quit date:      Years since quittin.9    Smokeless tobacco: Never   Substance Use Topics    Alcohol use: Yes     Alcohol/week: 2.0 standard drinks of alcohol     Types: 2 Standard drinks or equivalent per week    Drug use: Not Currently       Allergies:  Allergies   Allergen Reactions    Lisinopril Cough       Assessment/Plan  About 4 days s/p HoLEP, emptying adequately. Stream is reasonable, not markedly better. Nocturia 5x, discussed this may be due to ongoing healing.     I recommend doing a cystoscopy on his next visit to evaluate any scar or stricture.     Follow up with cysto on 24    Scribe Attestation  By signing my name below, I, Christie Burnham, attest that this documentation has been prepared under the direction and in the presence of Cole Multani MD.

## 2024-11-18 LAB
LABORATORY COMMENT REPORT: NORMAL
PATH REPORT.FINAL DX SPEC: NORMAL
PATH REPORT.GROSS SPEC: NORMAL
PATH REPORT.RELEVANT HX SPEC: NORMAL
PATH REPORT.TOTAL CANCER: NORMAL

## 2024-11-25 NOTE — PROGRESS NOTES
HPI    74 y.o. male being seen with the following problem list:    Problem list:  BPH with LUTS - urgency, frequency and borderline urinary retention s/p HoLEP 11/11/24  Hx of gross hematuria      1/4/24 - here today to discuss outlet procedures due to hx of urinary symptoms. He has some retention along with some nocturia, not as bothersome or troublesome at this time. No red flag symptoms. No UTIs, hematuruia, stones. PVR last mo with Dr. Ventura 193cc     7/19/24 - Seen today for 6mo symptom check. PVR 298cc. Reports some intermittent urgency with incomplete emptying, sometimes double voiding. Overall not too bothered     8/28/24 - seen back for cysto. PVR after cysto 310cc     11/11/24 - S/p HoLEP Path benign 18g     11/13/24 - Presents today for TOV. 200cc in, 50cc out. Only a minimal urge when buitrago removed. No issues since surgery     11/15/24 - PVR 256cc. Thinks his stream is reasonable, not markedly better. Nocturia 5x.     11/27/24 - PVR 164cc. Emptying better, urinary symptoms have improved. Nocturia down to 1-2x, pre op was 5x. Urgency significantly improved.         Current Medications:  Current Outpatient Medications   Medication Sig Dispense Refill    acetaminophen (Tylenol) 325 mg tablet Take by mouth every 6 hours if needed for mild pain (1 - 3).      aspirin 81 mg chewable tablet Chew 1 tablet (81 mg) once daily.      atorvastatin (Lipitor) 80 mg tablet TAKE 1 TABLET DAILY AT BEDTIME 90 tablet 3    blood sugar diagnostic (FreeStyle Lite Strips) strip TEST ONCE DAILY 100 strip 3    calcium carbonate-vitamin D3 600 mg-20 mcg (800 unit) tablet Take 2 tablets by mouth 2 times a day.      cholecalciferol (Vitamin D3) 25 MCG (1000 UT) tablet Take 1 tablet (1,000 Units) by mouth once daily. (Patient not taking: Reported on 11/11/2024)      ciclopirox (Penlac) 8 % solution Apply topically once daily at bedtime.      docusate sodium (Colace) 100 mg capsule Take 1 capsule (100 mg) by mouth 2 times a day. Hold  for loose stools 30 capsule 0    ferrous sulfate, 325 mg ferrous sulfate, tablet Take 1 tablet by mouth once daily. 90 tablet 1    FreeStyle Lancets 28 gauge TEST ONCE DAILY 100 each 3    Jardiance 25 mg Take 1 tablet (25 mg) by mouth once daily. 90 tablet 1    metFORMIN (Glucophage) 1,000 mg tablet Take 1 tablet (1,000 mg) by mouth 2 times a day. 180 tablet 1    nitroglycerin (Nitrostat) 0.4 mg SL tablet Place 1 tablet (0.4 mg) under the tongue every 5 minutes if needed. (Patient not taking: Reported on 11/11/2024)      phenazopyridine (Pyridium) 100 mg tablet Take 1 tablet (100 mg) by mouth 3 times a day as needed (burning). 15 tablet 0    SITagliptin phosphate (Januvia) 100 mg tablet Take 1 tablet (100 mg) by mouth once daily. 90 tablet 1    Synthroid 150 mcg tablet Take 1 tablet (150 mcg) by mouth once daily. 90 tablet 3     No current facility-administered medications for this visit.        Active Problems:  José Miguel Lua is a 74 y.o. male with the following Problems and Medications.  Patient Active Problem List   Diagnosis    Arteriosclerotic coronary artery disease    Cancer of thyroid (Multi)    Essential hypertension    Hypothyroidism    Iron deficiency anemia    Microalbuminuria due to type 2 diabetes mellitus (Multi)    S/P CABG x 3    Type 2 diabetes mellitus with hyperlipidemia (Multi)    Athscl heart disease of native coronary artery w/o ang pctrs    BPH with obstruction/lower urinary tract symptoms    Hyperlipidemia    Urinary retention     Current Outpatient Medications   Medication Sig Dispense Refill    acetaminophen (Tylenol) 325 mg tablet Take by mouth every 6 hours if needed for mild pain (1 - 3).      aspirin 81 mg chewable tablet Chew 1 tablet (81 mg) once daily.      atorvastatin (Lipitor) 80 mg tablet TAKE 1 TABLET DAILY AT BEDTIME 90 tablet 3    blood sugar diagnostic (FreeStyle Lite Strips) strip TEST ONCE DAILY 100 strip 3    calcium carbonate-vitamin D3 600 mg-20 mcg (800 unit) tablet  Take 2 tablets by mouth 2 times a day.      cholecalciferol (Vitamin D3) 25 MCG (1000 UT) tablet Take 1 tablet (1,000 Units) by mouth once daily. (Patient not taking: Reported on 11/11/2024)      ciclopirox (Penlac) 8 % solution Apply topically once daily at bedtime.      docusate sodium (Colace) 100 mg capsule Take 1 capsule (100 mg) by mouth 2 times a day. Hold for loose stools 30 capsule 0    ferrous sulfate, 325 mg ferrous sulfate, tablet Take 1 tablet by mouth once daily. 90 tablet 1    FreeStyle Lancets 28 gauge TEST ONCE DAILY 100 each 3    Jardiance 25 mg Take 1 tablet (25 mg) by mouth once daily. 90 tablet 1    metFORMIN (Glucophage) 1,000 mg tablet Take 1 tablet (1,000 mg) by mouth 2 times a day. 180 tablet 1    nitroglycerin (Nitrostat) 0.4 mg SL tablet Place 1 tablet (0.4 mg) under the tongue every 5 minutes if needed. (Patient not taking: Reported on 11/11/2024)      phenazopyridine (Pyridium) 100 mg tablet Take 1 tablet (100 mg) by mouth 3 times a day as needed (burning). 15 tablet 0    SITagliptin phosphate (Januvia) 100 mg tablet Take 1 tablet (100 mg) by mouth once daily. 90 tablet 1    Synthroid 150 mcg tablet Take 1 tablet (150 mcg) by mouth once daily. 90 tablet 3     No current facility-administered medications for this visit.       PMH:  Past Medical History:   Diagnosis Date    Anemia     15.4/46.4 1/5    Atherosclerotic heart disease of native coronary artery with unspecified angina pectoris 06/23/2021    Coronary artery disease involving native coronary artery of native heart with angina pectoris    BPH (benign prostatic hyperplasia)     Disease of thyroid gland     thyroid cancer    GERD (gastroesophageal reflux disease)     Hyperlipidemia     Hypertension     Hypocalcemia 12/16/2019    Hypocalcemia/ s/p parathyroidectomy of one nodule    Type 2 diabetes mellitus     a1c 6.6 7/18       PSH:  Past Surgical History:   Procedure Laterality Date    OTHER SURGICAL HISTORY  05/11/2018     Parathyroid Surgery    OTHER SURGICAL HISTORY  2019    Coronary artery bypass graft/ cardiologist dr. young 23 f/u 1 year    TOTAL THYROIDECTOMY  10/22/2013    Thyroid Surgery Total Thyroidectomy       FMH:  Family History   Problem Relation Name Age of Onset    Dementia Mother      Alcohol abuse Father      Nephrolithiasis Brother      No Known Problems Half-Brother         SHx:  Social History     Tobacco Use    Smoking status: Former     Current packs/day: 0.00     Average packs/day: 0.5 packs/day for 14.0 years (7.0 ttl pk-yrs)     Types: Cigarettes     Start date:      Quit date:      Years since quittin.9    Smokeless tobacco: Never   Substance Use Topics    Alcohol use: Not Currently     Alcohol/week: 2.0 standard drinks of alcohol     Types: 2 Standard drinks or equivalent per week    Drug use: Never       Allergies:  Allergies   Allergen Reactions    Lisinopril Cough       Assessment/Plan  About 2 weeks s/p HoLEP. Emptying better. Urinary symptoms have significantly improved from last visit. Nocturia down to 2x. Did not proceed with cysto today as he is doing well symptomatically and would like to continue observation.     Follow up in 6 weeks over .     Hernesto Attestation  By signing my name below, I, Christie Burnham, attest that this documentation has been prepared under the direction and in the presence of Cole Multani MD.

## 2024-11-27 ENCOUNTER — APPOINTMENT (OUTPATIENT)
Dept: UROLOGY | Facility: HOSPITAL | Age: 74
End: 2024-11-27
Payer: MEDICARE

## 2024-11-27 ENCOUNTER — PROCEDURE VISIT (OUTPATIENT)
Dept: UROLOGY | Facility: HOSPITAL | Age: 74
End: 2024-11-27
Payer: MEDICARE

## 2024-11-27 DIAGNOSIS — N40.1 BPH WITH OBSTRUCTION/LOWER URINARY TRACT SYMPTOMS: ICD-10-CM

## 2024-11-27 DIAGNOSIS — R33.9 INCOMPLETE BLADDER EMPTYING: Primary | ICD-10-CM

## 2024-11-27 DIAGNOSIS — N13.8 BPH WITH OBSTRUCTION/LOWER URINARY TRACT SYMPTOMS: ICD-10-CM

## 2024-11-27 PROCEDURE — 51798 US URINE CAPACITY MEASURE: CPT | Performed by: UROLOGY

## 2024-11-27 PROCEDURE — 99024 POSTOP FOLLOW-UP VISIT: CPT | Performed by: UROLOGY

## 2024-11-27 PROCEDURE — 99211 OFF/OP EST MAY X REQ PHY/QHP: CPT | Performed by: UROLOGY

## 2024-12-05 NOTE — PROGRESS NOTES
Virtual or Telephone Consent    An interactive audio and video telecommunication system which permits real time communications between the patient (at the originating site) and provider (at the distant site) was utilized to provide this telehealth service.   Verbal consent was requested and obtained from José Miguel Lua on this date, 12/06/24 for a telehealth visit.      Last seen - 12/15/23     HISTORY OF PRESENT ILLNESS:   José Miguel Lua is a 74 y.o. male who is being seen today for annual FUV    Hx:  -gross hematuria  -CT urogram 6/9/23 unremarkable  -negative cystoscopy  -urinary urgency and NTF on daily dosing Cialis   -negative prostate biopsy 11/11/24    PAST MEDICAL HISTORY:  Past Medical History:   Diagnosis Date    Anemia     15.4/46.4 1/5    Atherosclerotic heart disease of native coronary artery with unspecified angina pectoris 06/23/2021    Coronary artery disease involving native coronary artery of native heart with angina pectoris    BPH (benign prostatic hyperplasia)     Disease of thyroid gland     thyroid cancer    GERD (gastroesophageal reflux disease)     Hyperlipidemia     Hypertension     Hypocalcemia 12/16/2019    Hypocalcemia/ s/p parathyroidectomy of one nodule    Type 2 diabetes mellitus     a1c 6.6 7/18       PAST SURGICAL HISTORY:  Past Surgical History:   Procedure Laterality Date    OTHER SURGICAL HISTORY  05/11/2018    Parathyroid Surgery    OTHER SURGICAL HISTORY  12/29/2019    Coronary artery bypass graft/ cardiologist dr. young 11/16/23 f/u 1 year    TOTAL THYROIDECTOMY  10/22/2013    Thyroid Surgery Total Thyroidectomy        ALLERGIES:   Allergies   Allergen Reactions    Lisinopril Cough        MEDICATIONS:   Current Outpatient Medications   Medication Instructions    acetaminophen (Tylenol) 325 mg tablet Every 6 hours PRN    aspirin 81 mg, Daily    atorvastatin (LIPITOR) 80 mg, oral, Nightly    blood sugar diagnostic (FreeStyle Lite Strips) strip TEST ONCE DAILY    calcium  "carbonate-vitamin D3 600 mg-20 mcg (800 unit) tablet 2 tablets, 2 times daily    cholecalciferol (VITAMIN D3) 1,000 Units, Daily    ciclopirox (Penlac) 8 % solution Nightly    docusate sodium (COLACE) 100 mg, oral, 2 times daily, Hold for loose stools    ferrous sulfate, 325 mg ferrous sulfate, tablet 1 tablet, oral, Daily    FreeStyle Lancets 28 gauge TEST ONCE DAILY    Jardiance 25 mg, oral, Daily    metFORMIN (GLUCOPHAGE) 1,000 mg, oral, 2 times daily    nitroglycerin (NITROSTAT) 0.4 mg, Every 5 min PRN    phenazopyridine (PYRIDIUM) 100 mg, oral, 3 times daily PRN    SITagliptin phosphate (JANUVIA) 100 mg, oral, Daily    Synthroid 150 mcg, oral, Daily        PHYSICAL EXAM:  There were no vitals taken for this visit.  Constitutional: Patient appears well-developed and well-nourished. No distress.    Pulmonary/Chest: Effort normal. No respiratory distress.   Abdominal: Soft, ND NT  : WNL  Musculoskeletal: Normal range of motion.    Neurological: Alert and oriented to person, place, and time.  Psychiatric: Normal mood and affect. Behavior is normal. Thought content normal.      Labs:  No results found for: \"TESTOSTERONE\"  No results found for: \"PSA\"  No components found for: \"CBC\"  Lab Results   Component Value Date    CREATININE 0.67 10/14/2024     No components found for: \"TESTOTMS\"  No results found for: \"TESTF\"    Imaging:    Discussion: Underwent HOLEP with Dr Multani 11/11/24 that went very well. Pt denies any new occurrence hematuria. No urinary complaints at this time. Recent biopsy was negative. Denies hematuria, dysuria, nocturia, flank pain, and bothersome frequency or urgency. Denies pushing or straining to void and states he feels he empties his bladder when voiding. Notes that daily Cialis is helping significantly with sx. He will follow-up with Dr. Multani end of January. Advised to repeat PSA in 1 year.      Assessment:      1. Screening for prostate cancer  PSA        José Miguel Lua is a 74 y.o. male " here for FUV     Plan:   Will follow-up with Dr. Multani. Follow-up in this clinic as needed.  All questions and concerns were addressed. Patient verbalizes understanding and has no other questions at this time.     Scribe Attestation  By signing my name below, ILina Scribe   attest that this documentation has been prepared under the direction and in the presence of Ulisses Ventura MD.

## 2024-12-06 ENCOUNTER — TELEMEDICINE (OUTPATIENT)
Dept: UROLOGY | Facility: HOSPITAL | Age: 74
End: 2024-12-06
Payer: MEDICARE

## 2024-12-06 DIAGNOSIS — Z12.5 SCREENING FOR PROSTATE CANCER: ICD-10-CM

## 2024-12-06 PROCEDURE — G2211 COMPLEX E/M VISIT ADD ON: HCPCS | Performed by: UROLOGY

## 2024-12-06 PROCEDURE — 1157F ADVNC CARE PLAN IN RCRD: CPT | Performed by: UROLOGY

## 2024-12-06 PROCEDURE — 1123F ACP DISCUSS/DSCN MKR DOCD: CPT | Performed by: UROLOGY

## 2024-12-06 PROCEDURE — 99214 OFFICE O/P EST MOD 30 MIN: CPT | Performed by: UROLOGY

## 2025-01-06 DIAGNOSIS — N52.9 ERECTILE DISORDER: Primary | ICD-10-CM

## 2025-01-06 RX ORDER — TADALAFIL 5 MG/1
5 TABLET ORAL DAILY
Qty: 30 TABLET | Refills: 11 | Status: SHIPPED | OUTPATIENT
Start: 2025-01-06 | End: 2026-01-01

## 2025-01-13 ENCOUNTER — LAB (OUTPATIENT)
Dept: LAB | Facility: LAB | Age: 75
End: 2025-01-13
Payer: MEDICARE

## 2025-01-13 ENCOUNTER — APPOINTMENT (OUTPATIENT)
Dept: PRIMARY CARE | Facility: CLINIC | Age: 75
End: 2025-01-13
Payer: MEDICARE

## 2025-01-13 VITALS
OXYGEN SATURATION: 98 % | HEIGHT: 72 IN | WEIGHT: 201 LBS | SYSTOLIC BLOOD PRESSURE: 116 MMHG | BODY MASS INDEX: 27.22 KG/M2 | DIASTOLIC BLOOD PRESSURE: 61 MMHG | HEART RATE: 57 BPM | RESPIRATION RATE: 14 BRPM

## 2025-01-13 DIAGNOSIS — E11.69 TYPE 2 DIABETES MELLITUS WITH HYPERLIPIDEMIA (MULTI): ICD-10-CM

## 2025-01-13 DIAGNOSIS — E11.29 TYPE 2 DIABETES MELLITUS WITH DIABETIC MICROALBUMINURIA, WITHOUT LONG-TERM CURRENT USE OF INSULIN (MULTI): ICD-10-CM

## 2025-01-13 DIAGNOSIS — D50.9 IRON DEFICIENCY ANEMIA, UNSPECIFIED IRON DEFICIENCY ANEMIA TYPE: ICD-10-CM

## 2025-01-13 DIAGNOSIS — E78.5 HYPERLIPIDEMIA, UNSPECIFIED HYPERLIPIDEMIA TYPE: ICD-10-CM

## 2025-01-13 DIAGNOSIS — E11.29 MICROALBUMINURIA DUE TO TYPE 2 DIABETES MELLITUS (MULTI): ICD-10-CM

## 2025-01-13 DIAGNOSIS — E78.5 TYPE 2 DIABETES MELLITUS WITH HYPERLIPIDEMIA (MULTI): ICD-10-CM

## 2025-01-13 DIAGNOSIS — Z12.5 SCREENING FOR PROSTATE CANCER: ICD-10-CM

## 2025-01-13 DIAGNOSIS — I10 ESSENTIAL HYPERTENSION: ICD-10-CM

## 2025-01-13 DIAGNOSIS — E11.9 TYPE 2 DIABETES MELLITUS WITHOUT COMPLICATION, WITHOUT LONG-TERM CURRENT USE OF INSULIN (MULTI): ICD-10-CM

## 2025-01-13 DIAGNOSIS — N13.8 BPH WITH OBSTRUCTION/LOWER URINARY TRACT SYMPTOMS: ICD-10-CM

## 2025-01-13 DIAGNOSIS — E03.9 HYPOTHYROIDISM, UNSPECIFIED TYPE: ICD-10-CM

## 2025-01-13 DIAGNOSIS — Z00.00 MEDICARE ANNUAL WELLNESS VISIT, SUBSEQUENT: Primary | ICD-10-CM

## 2025-01-13 DIAGNOSIS — R80.9 MICROALBUMINURIA DUE TO TYPE 2 DIABETES MELLITUS (MULTI): ICD-10-CM

## 2025-01-13 DIAGNOSIS — R80.9 TYPE 2 DIABETES MELLITUS WITH DIABETIC MICROALBUMINURIA, WITHOUT LONG-TERM CURRENT USE OF INSULIN (MULTI): ICD-10-CM

## 2025-01-13 DIAGNOSIS — I25.10 ATHSCL HEART DISEASE OF NATIVE CORONARY ARTERY W/O ANG PCTRS: ICD-10-CM

## 2025-01-13 DIAGNOSIS — N40.1 BPH WITH OBSTRUCTION/LOWER URINARY TRACT SYMPTOMS: ICD-10-CM

## 2025-01-13 PROBLEM — Z85.850 HISTORY OF THYROID CANCER: Status: ACTIVE | Noted: 2023-06-02

## 2025-01-13 LAB
ALT SERPL W P-5'-P-CCNC: 21 U/L (ref 10–52)
ANION GAP SERPL CALC-SCNC: 13 MMOL/L (ref 10–20)
AST SERPL W P-5'-P-CCNC: 19 U/L (ref 9–39)
BUN SERPL-MCNC: 15 MG/DL (ref 6–23)
CALCIUM SERPL-MCNC: 8.8 MG/DL (ref 8.6–10.6)
CHLORIDE SERPL-SCNC: 102 MMOL/L (ref 98–107)
CHOLEST SERPL-MCNC: 131 MG/DL (ref 0–199)
CHOLESTEROL/HDL RATIO: 3.5
CO2 SERPL-SCNC: 29 MMOL/L (ref 21–32)
CREAT SERPL-MCNC: 0.71 MG/DL (ref 0.5–1.3)
CREAT UR-MCNC: 55.8 MG/DL (ref 20–370)
EGFRCR SERPLBLD CKD-EPI 2021: >90 ML/MIN/1.73M*2
ERYTHROCYTE [DISTWIDTH] IN BLOOD BY AUTOMATED COUNT: 13.3 % (ref 11.5–14.5)
EST. AVERAGE GLUCOSE BLD GHB EST-MCNC: 148 MG/DL
GLUCOSE SERPL-MCNC: 126 MG/DL (ref 74–99)
HBA1C MFR BLD: 6.8 %
HCT VFR BLD AUTO: 45.5 % (ref 41–52)
HDLC SERPL-MCNC: 37.3 MG/DL
HGB BLD-MCNC: 15.1 G/DL (ref 13.5–17.5)
LDLC SERPL CALC-MCNC: 80 MG/DL
MCH RBC QN AUTO: 32.1 PG (ref 26–34)
MCHC RBC AUTO-ENTMCNC: 33.2 G/DL (ref 32–36)
MCV RBC AUTO: 97 FL (ref 80–100)
MICROALBUMIN UR-MCNC: 88.9 MG/L
MICROALBUMIN/CREAT UR: 159.3 UG/MG CREAT
NON HDL CHOLESTEROL: 94 MG/DL (ref 0–149)
NRBC BLD-RTO: 0 /100 WBCS (ref 0–0)
PLATELET # BLD AUTO: 209 X10*3/UL (ref 150–450)
POTASSIUM SERPL-SCNC: 4.2 MMOL/L (ref 3.5–5.3)
PSA SERPL-MCNC: 1.27 NG/ML
RBC # BLD AUTO: 4.71 X10*6/UL (ref 4.5–5.9)
SODIUM SERPL-SCNC: 140 MMOL/L (ref 136–145)
TRIGL SERPL-MCNC: 67 MG/DL (ref 0–149)
VIT B12 SERPL-MCNC: 378 PG/ML (ref 211–911)
VLDL: 13 MG/DL (ref 0–40)
WBC # BLD AUTO: 8.1 X10*3/UL (ref 4.4–11.3)

## 2025-01-13 PROCEDURE — G0439 PPPS, SUBSEQ VISIT: HCPCS | Performed by: FAMILY MEDICINE

## 2025-01-13 PROCEDURE — 82570 ASSAY OF URINE CREATININE: CPT

## 2025-01-13 PROCEDURE — 84460 ALANINE AMINO (ALT) (SGPT): CPT

## 2025-01-13 PROCEDURE — 1036F TOBACCO NON-USER: CPT | Performed by: FAMILY MEDICINE

## 2025-01-13 PROCEDURE — 85027 COMPLETE CBC AUTOMATED: CPT

## 2025-01-13 PROCEDURE — 83036 HEMOGLOBIN GLYCOSYLATED A1C: CPT

## 2025-01-13 PROCEDURE — 1157F ADVNC CARE PLAN IN RCRD: CPT | Performed by: FAMILY MEDICINE

## 2025-01-13 PROCEDURE — 99214 OFFICE O/P EST MOD 30 MIN: CPT | Performed by: FAMILY MEDICINE

## 2025-01-13 PROCEDURE — G0103 PSA SCREENING: HCPCS

## 2025-01-13 PROCEDURE — 80061 LIPID PANEL: CPT

## 2025-01-13 PROCEDURE — 84450 TRANSFERASE (AST) (SGOT): CPT

## 2025-01-13 PROCEDURE — 1160F RVW MEDS BY RX/DR IN RCRD: CPT | Performed by: FAMILY MEDICINE

## 2025-01-13 PROCEDURE — 82607 VITAMIN B-12: CPT

## 2025-01-13 PROCEDURE — 1159F MED LIST DOCD IN RCRD: CPT | Performed by: FAMILY MEDICINE

## 2025-01-13 PROCEDURE — 3078F DIAST BP <80 MM HG: CPT | Performed by: FAMILY MEDICINE

## 2025-01-13 PROCEDURE — 1170F FXNL STATUS ASSESSED: CPT | Performed by: FAMILY MEDICINE

## 2025-01-13 PROCEDURE — 3074F SYST BP LT 130 MM HG: CPT | Performed by: FAMILY MEDICINE

## 2025-01-13 PROCEDURE — 80048 BASIC METABOLIC PNL TOTAL CA: CPT

## 2025-01-13 PROCEDURE — 1123F ACP DISCUSS/DSCN MKR DOCD: CPT | Performed by: FAMILY MEDICINE

## 2025-01-13 PROCEDURE — 82043 UR ALBUMIN QUANTITATIVE: CPT

## 2025-01-13 RX ORDER — EMPAGLIFLOZIN 25 MG/1
25 TABLET, FILM COATED ORAL DAILY
Qty: 100 TABLET | Refills: 1 | Status: SHIPPED | OUTPATIENT
Start: 2025-01-13

## 2025-01-13 RX ORDER — METFORMIN HYDROCHLORIDE 1000 MG/1
1000 TABLET ORAL 2 TIMES DAILY
Qty: 200 TABLET | Refills: 1 | Status: SHIPPED | OUTPATIENT
Start: 2025-01-13

## 2025-01-13 ASSESSMENT — ACTIVITIES OF DAILY LIVING (ADL)
DRESSING: INDEPENDENT
MANAGING_FINANCES: INDEPENDENT
TAKING_MEDICATION: INDEPENDENT
DOING_HOUSEWORK: INDEPENDENT
GROCERY_SHOPPING: INDEPENDENT
BATHING: INDEPENDENT

## 2025-01-13 ASSESSMENT — PATIENT HEALTH QUESTIONNAIRE - PHQ9
2. FEELING DOWN, DEPRESSED OR HOPELESS: NOT AT ALL
1. LITTLE INTEREST OR PLEASURE IN DOING THINGS: NOT AT ALL
SUM OF ALL RESPONSES TO PHQ9 QUESTIONS 1 AND 2: 0

## 2025-01-13 NOTE — PATIENT INSTRUCTIONS
Risk factors identified during visit:   BMI<18.5 or >25: Recommend weight loss efforts (see www.[email or web address]/category/nutrition for ideas)  BMI<18.5 or >25: Patient declines nutrition referrals    Flu shot: Up to date.  PPSV23: Up to date.  PCV13: Up to date.  PCV20: N/A.  RSV: Recommended (local pharmacy).  Shingrix: Up to date.  Colon cancer screening: Up to date (2022, repeat in 5 years).   AAA screening: Up to date.  HIV screening: N/A.  Hepatitis C screening: Up to date.  Prostate cancer screening: Ordered by urology.    Recommend living will, health care power of .    Get fasting labs as previously ordered.  Will refill meds after reviewing lab results.  Referred to ophthalmology (eye exam) and podiatry (foot exam).  Follow up with specialists as directed.     F/U 6 months: Med refills.    Lab services: [address]  Hours: M-F 6:30a-6p, Sat 8a-12p  Phone: [phone], Option 1

## 2025-01-13 NOTE — PROGRESS NOTES
Subjective   Reason for Visit: José Miguel Lua is an 74 y.o. male here for a Medicare Wellness visit.     Past Medical, Surgical, and Family History reviewed and updated in chart.    Reviewed all medications by prescribing practitioner or clinical pharmacist (such as prescriptions, OTCs, herbal therapies and supplements) and documented in the medical record.    HPI    Patient Self Assessment of Health Status  Patient Self Assessment: Good    Nutrition and Exercise  Current Diet: Heart Healthy Diet, Diabetic Diet  Adequate Fluid Intake: Yes  Caffeine: Yes  Exercise Frequency: Infrequently    Functional Ability/Level of Safety  Cognitive Impairment Observed: No cognitive impairment observed  Cognitive Impairment Reported: No cognitive impairment reported by patient or family    Home Safety Risk Factors: None    Has DM.  Condition(s) stable.  Taking med(s) as directed.  Requests refills.    Did not get fasting labs prior to visit as advised.    Medicare Wellness Billing Compliance Satisfied    *This is a visual tool to show completion of required items on the day of the visit. Green checks will only appear on the date of visit.    Review all medications by prescribing practitioner or clinical pharmacist (such as prescriptions, OTCs, herbal therapies and supplements) documented in the medical record    Past Medical, Surgical, and Family History reviewed and updated in chart    Tobacco Use Reviewed    Alcohol Use Reviewed    Illicit Drug Use Reviewed    PHQ2/9    Falls in Last Year Reviewed    Home Safety Risk Factors Reviewed    Cognitive Impairment Reviewed    Patient Self Assessment and Health Status    Current Diet Reviewed    Exercise Frequency    ADL - Hearing Impairment    ADL - Bathing    ADL - Dressing    ADL - Walks in Home    IADL - Managing Finances    IADL - Grocery Shopping    IADL - Taking Medications    IADL - Doing Housework      Patient Care Team:  Diony Tamayo MD as PCP -  General  Diony Tamayo MD as PCP - [facility]ributed Provider     Review of Systems  No other complaints.     Objective   Vitals:  /61   Pulse 57   Resp 14   Ht 1.829 m (6')   Wt 91.2 kg (201 lb)   SpO2 98%   BMI 27.26 kg/m²       Physical Exam  Constitutional:       General: He is not in acute distress.     Appearance: He is overweight.   Musculoskeletal:      Comments: Ambulating w/o assistance   Neurological:      Mental Status: He is oriented to person, place, and time.   Psychiatric:         Mood and Affect: Mood normal.         Behavior: Behavior normal.     Assessment/Plan   Diagnoses and all orders for this visit:  Medicare annual wellness visit, subsequent  Type 2 diabetes mellitus with diabetic microalbuminuria, without long-term current use of insulin (Multi)  -     Stable based on labs.  Continue established Tx plan.  F/U plans documented in the encounter note.    -     Referral to Ophthalmology; Future  -     Referral to Podiatry; Future  Iron deficiency anemia, unspecified iron deficiency anemia type        -     Tx w/otc iron  Hyperlipidemia, unspecified hyperlipidemia type        -     Tx by cardiology  Essential hypertension        -     Tx by cardiology  Athscl heart disease of native coronary artery w/o ang pctrs  Hypothyroidism, unspecified type        -     Tx by endo  BPH with obstruction/lower urinary tract symptoms        -     Tx by urology    Risk factors identified during visit:   BMI<18.5 or >25: Recommend weight loss efforts (see [email or web address]/category/nutrition for ideas)  BMI<18.5 or >25: Patient declines nutrition referrals    Flu shot: Up to date.  PPSV23: Up to date.  PCV13: Up to date.  PCV20: N/A.  RSV: Recommended (local pharmacy).  Shingrix: Up to date.  Colon cancer screening: Up to date (2022, repeat in 5 years).   AAA screening: Up to date.  HIV screening: N/A.  Hepatitis C screening: Up to date.  Prostate cancer screening: Ordered by  urology.    Recommend living will, health care power of .    Get fasting labs as previously ordered.  Will refill meds after reviewing lab results.  Referred to ophthalmology (eye exam) and podiatry (foot exam).  Follow up with specialists as directed.     F/U 6 months: Med refills.

## 2025-01-16 DIAGNOSIS — E11.69 TYPE 2 DIABETES MELLITUS WITH HYPERLIPIDEMIA (MULTI): ICD-10-CM

## 2025-01-16 DIAGNOSIS — E78.5 TYPE 2 DIABETES MELLITUS WITH HYPERLIPIDEMIA (MULTI): ICD-10-CM

## 2025-01-21 NOTE — PROGRESS NOTES
HPI    74 y.o. male being seen with the following problem list:    Problem list:  BPH with LUTS - urgency, frequency and borderline urinary retention s/p HoLEP 11/11/24  Hx of gross hematuria      1/4/24 - here today to discuss outlet procedures due to hx of urinary symptoms. He has some retention along with some nocturia, not as bothersome or troublesome at this time. No red flag symptoms. No UTIs, hematuruia, stones. PVR last mo with Dr. Ventura 193cc     7/19/24 - Seen today for 6mo symptom check. PVR 298cc. Reports some intermittent urgency with incomplete emptying, sometimes double voiding. Overall not too bothered     8/28/24 - seen back for cysto. PVR after cysto 310cc     11/11/24 - S/p HoLEP Path benign 18g     11/13/24 - Presents today for TOV. 200cc in, 50cc out. Only a minimal urge when buitrago removed. No issues since surgery     11/15/24 - PVR 256cc. Thinks his stream is reasonable, not markedly better. Nocturia 5x.      11/27/24 - PVR 164cc. Emptying better, urinary symptoms have improved. Nocturia down to 1-2x, pre op was 5x. Urgency significantly improved.     01/22/25 - Seen today over telehealth, performed this visit using real-time telehealth tools, including an audio/video connection between José Miguel Lua at home and Cole Multani MD at [facility]. Consent for telemedicine visit was obtained.   Thinks his symptoms have improved, nocturia down to 1x most night, stream is much stronger, still with post void dribbling. Erections work well on Cialis 5mg, would like to continue.,      Lab Results   Component Value Date    PSA 1.27 01/13/2025              Current Medications:  Current Outpatient Medications   Medication Sig Dispense Refill    acetaminophen (Tylenol) 325 mg tablet Take by mouth every 6 hours if needed for mild pain (1 - 3).      aspirin 81 mg chewable tablet Chew 1 tablet (81 mg) once daily.      atorvastatin (Lipitor) 80 mg tablet TAKE 1 TABLET DAILY AT BEDTIME 90 tablet 3     blood sugar diagnostic (FreeStyle Lite Strips) strip TEST ONCE DAILY 100 strip 3    calcium carbonate-vitamin D3 600 mg-20 mcg (800 unit) tablet Take 2 tablets by mouth 2 times a day.      cholecalciferol (Vitamin D3) 25 MCG (1000 UT) tablet Take 1 tablet (1,000 Units) by mouth once daily. (Patient not taking: Reported on 11/11/2024)      ciclopirox (Penlac) 8 % solution Apply topically once daily at bedtime.      docusate sodium (Colace) 100 mg capsule Take 1 capsule (100 mg) by mouth 2 times a day. Hold for loose stools 30 capsule 0    ferrous sulfate, 325 mg ferrous sulfate, tablet Take 1 tablet by mouth once daily. 90 tablet 1    FreeStyle Lancets 28 gauge TEST ONCE DAILY 100 each 3    Jardiance 25 mg Take 1 tablet (25 mg) by mouth once daily. 100 tablet 1    metFORMIN (Glucophage) 1,000 mg tablet Take 1 tablet (1,000 mg) by mouth 2 times a day. 200 tablet 1    phenazopyridine (Pyridium) 100 mg tablet Take 1 tablet (100 mg) by mouth 3 times a day as needed (burning). 15 tablet 0    SITagliptin phosphate (Januvia) 100 mg tablet Take 1 tablet (100 mg) by mouth once daily. 100 tablet 1    Synthroid 150 mcg tablet Take 1 tablet (150 mcg) by mouth once daily. 90 tablet 3    tadalafil (Cialis) 5 mg tablet Take 1 tablet (5 mg) by mouth once daily. 30 tablet 11     No current facility-administered medications for this visit.        Active Problems:  José Miguel Lua is a 74 y.o. male with the following Problems and Medications.  Patient Active Problem List   Diagnosis    Arteriosclerotic coronary artery disease    History of thyroid cancer    Essential hypertension    Hypothyroidism    Iron deficiency anemia    S/P CABG x 3    Type 2 diabetes mellitus with diabetic microalbuminuria, without long-term current use of insulin (Multi)    Athscl heart disease of native coronary artery w/o ang pctrs    BPH with obstruction/lower urinary tract symptoms    Hyperlipidemia    Urinary retention     Current Outpatient  Medications   Medication Sig Dispense Refill    acetaminophen (Tylenol) 325 mg tablet Take by mouth every 6 hours if needed for mild pain (1 - 3).      aspirin 81 mg chewable tablet Chew 1 tablet (81 mg) once daily.      atorvastatin (Lipitor) 80 mg tablet TAKE 1 TABLET DAILY AT BEDTIME 90 tablet 3    blood sugar diagnostic (FreeStyle Lite Strips) strip TEST ONCE DAILY 100 strip 3    calcium carbonate-vitamin D3 600 mg-20 mcg (800 unit) tablet Take 2 tablets by mouth 2 times a day.      cholecalciferol (Vitamin D3) 25 MCG (1000 UT) tablet Take 1 tablet (1,000 Units) by mouth once daily. (Patient not taking: Reported on 11/11/2024)      ciclopirox (Penlac) 8 % solution Apply topically once daily at bedtime.      docusate sodium (Colace) 100 mg capsule Take 1 capsule (100 mg) by mouth 2 times a day. Hold for loose stools 30 capsule 0    ferrous sulfate, 325 mg ferrous sulfate, tablet Take 1 tablet by mouth once daily. 90 tablet 1    FreeStyle Lancets 28 gauge TEST ONCE DAILY 100 each 3    Jardiance 25 mg Take 1 tablet (25 mg) by mouth once daily. 100 tablet 1    metFORMIN (Glucophage) 1,000 mg tablet Take 1 tablet (1,000 mg) by mouth 2 times a day. 200 tablet 1    phenazopyridine (Pyridium) 100 mg tablet Take 1 tablet (100 mg) by mouth 3 times a day as needed (burning). 15 tablet 0    SITagliptin phosphate (Januvia) 100 mg tablet Take 1 tablet (100 mg) by mouth once daily. 100 tablet 1    Synthroid 150 mcg tablet Take 1 tablet (150 mcg) by mouth once daily. 90 tablet 3    tadalafil (Cialis) 5 mg tablet Take 1 tablet (5 mg) by mouth once daily. 30 tablet 11     No current facility-administered medications for this visit.       PMH:  Past Medical History:   Diagnosis Date    Anemia     15.4/46.4 1/5    Atherosclerotic heart disease of native coronary artery with unspecified angina pectoris 06/23/2021    Coronary artery disease involving native coronary artery of native heart with angina pectoris    BPH (benign prostatic  hyperplasia)     Disease of thyroid gland     thyroid cancer    GERD (gastroesophageal reflux disease)     Hyperlipidemia     Hypertension     Hypocalcemia 2019    Hypocalcemia/ s/p parathyroidectomy of one nodule    Type 2 diabetes mellitus     a1c 6.6 7/18       PSH:  Past Surgical History:   Procedure Laterality Date    OTHER SURGICAL HISTORY  2018    Parathyroid Surgery    OTHER SURGICAL HISTORY  2019    Coronary artery bypass graft/ cardiologist dr. young 23 f/u 1 year    PROSTATE SURGERY      Laser    TOTAL THYROIDECTOMY  10/22/2013    Thyroid Surgery Total Thyroidectomy       FMH:  Family History   Problem Relation Name Age of Onset    Dementia Mother      Alcohol abuse Father      Nephrolithiasis Brother      No Known Problems Half-Brother         SHx:  Social History     Tobacco Use    Smoking status: Former     Current packs/day: 0.00     Average packs/day: 0.5 packs/day for 14.0 years (7.0 ttl pk-yrs)     Types: Cigarettes     Start date:      Quit date:      Years since quittin.0    Smokeless tobacco: Never   Substance Use Topics    Alcohol use: Not Currently     Alcohol/week: 2.0 standard drinks of alcohol     Types: 2 Standard drinks or equivalent per week    Drug use: Never       Allergies:  Allergies   Allergen Reactions    Lisinopril Cough       Assessment/Plan  About 3 months s/p HoLEP. Urinary symptoms have significantly improved, stream is stronger, no real urgency and frequency at this point. Nocturia down to 1x, minimal post void dribbling. Overall doing great.     Erections work well on Cialis 5mg, rx refilled.     Follow up in 3-4 months over .     Christie Attestation  By signing my name below, I, Christie Burnham, attest that this documentation has been prepared under the direction and in the presence of Cole Multani MD.

## 2025-01-22 ENCOUNTER — TELEMEDICINE (OUTPATIENT)
Dept: UROLOGY | Facility: HOSPITAL | Age: 75
End: 2025-01-22
Payer: MEDICARE

## 2025-01-22 DIAGNOSIS — N13.8 BPH WITH OBSTRUCTION/LOWER URINARY TRACT SYMPTOMS: Primary | ICD-10-CM

## 2025-01-22 DIAGNOSIS — N52.9 ERECTILE DISORDER: ICD-10-CM

## 2025-01-22 DIAGNOSIS — N40.1 BPH WITH OBSTRUCTION/LOWER URINARY TRACT SYMPTOMS: Primary | ICD-10-CM

## 2025-01-22 PROCEDURE — 99024 POSTOP FOLLOW-UP VISIT: CPT | Performed by: UROLOGY

## 2025-01-22 PROCEDURE — 1123F ACP DISCUSS/DSCN MKR DOCD: CPT | Performed by: UROLOGY

## 2025-01-22 PROCEDURE — 1157F ADVNC CARE PLAN IN RCRD: CPT | Performed by: UROLOGY

## 2025-01-22 RX ORDER — TADALAFIL 5 MG/1
5 TABLET ORAL DAILY
Qty: 30 TABLET | Refills: 11 | Status: SHIPPED | OUTPATIENT
Start: 2025-01-22 | End: 2026-01-17

## 2025-02-13 ENCOUNTER — APPOINTMENT (OUTPATIENT)
Dept: UROLOGY | Facility: HOSPITAL | Age: 75
End: 2025-02-13
Payer: MEDICARE

## 2025-02-27 RX ORDER — LANCETS 28 GAUGE
EACH MISCELLANEOUS
Refills: 0 | OUTPATIENT
Start: 2025-02-27

## 2025-05-21 NOTE — PROGRESS NOTES
HPI    Virtual or Telephone Consent    An interactive audio and video telecommunication system which permits real time communications between the patient (at the originating site) and provider (at the distant site) was utilized to provide this telehealth service.   Verbal consent was requested and obtained from José Miguel Lua on this date, 05/22/25 for a telehealth visit and the patient's location was confirmed at the time of the visit.    74 y.o. male being seen with the following problem list:    Problem list:  BPH with LUTS - urgency, frequency and borderline urinary retention s/p HoLEP 11/11/24  Hx of gross hematuria      1/4/24 - here today to discuss outlet procedures due to hx of urinary symptoms. He has some retention along with some nocturia, not as bothersome or troublesome at this time. No red flag symptoms. No UTIs, hematuruia, stones. PVR last mo with Dr. Ventura 193cc     7/19/24 - Seen today for 6mo symptom check. PVR 298cc. Reports some intermittent urgency with incomplete emptying, sometimes double voiding. Overall not too bothered     8/28/24 - seen back for cysto. PVR after cysto 310cc     11/11/24 - S/p HoLEP Path benign 18g     11/13/24 - Presents today for TOV. 200cc in, 50cc out. Only a minimal urge when buitrago removed. No issues since surgery     11/15/24 - PVR 256cc. Thinks his stream is reasonable, not markedly better. Nocturia 5x.      11/27/24 - PVR 164cc. Emptying better, urinary symptoms have improved. Nocturia down to 1-2x, pre op was 5x. Urgency significantly improved.      01/22/25 - Seen today over telehealth, performed this visit using real-time telehealth tools, including an audio/video connection between José Miguel Lua at home and Cole Multani MD at [facility]. Consent for telemedicine visit was obtained.   Thinks his symptoms have improved, nocturia down to 1x most night, stream is much stronger, still with post void dribbling. Erections work well on Cialis 5mg,  would like to continue.,    05/21/25 - Voiding well, like 20 years ago. No urgency. Better control. Erections are working with Cialis. Very please with results.     Lab Results   Component Value Date    PSA 1.27 01/13/2025         Current Medications:  Current Medications[1]     Active Problems:  José Miguel Lua is a 74 y.o. male with the following Problems and Medications.  Problem List[2]  Current Medications[3]    PMH:  Medical History[4]    PSH:  Surgical History[5]    FMH:  Family History[6]    SHx:  Social History[7]    Allergies:  RX Allergies[8]    Assessment/Plan  Patient is doing well with urinary symptoms and erections. He if very happy with results  Follow-up in 1 year for symptoms check, sooner if needed.         Scribe Attestation  By signing my name below, I, Amira Casas , Scribkavitha   attest that this documentation has been prepared under the direction and in the presence of Cole Multani MD.         [1]   Current Outpatient Medications   Medication Sig Dispense Refill    acetaminophen (Tylenol) 325 mg tablet Take by mouth every 6 hours if needed for mild pain (1 - 3).      aspirin 81 mg chewable tablet Chew 1 tablet (81 mg) once daily.      atorvastatin (Lipitor) 80 mg tablet TAKE 1 TABLET DAILY AT BEDTIME 90 tablet 3    blood sugar diagnostic (FreeStyle Lite Strips) strip TEST ONCE DAILY 100 strip 3    calcium carbonate-vitamin D3 600 mg-20 mcg (800 unit) tablet Take 2 tablets by mouth 2 times a day.      cholecalciferol (Vitamin D3) 25 MCG (1000 UT) tablet Take 1 tablet (1,000 Units) by mouth once daily. (Patient not taking: Reported on 11/11/2024)      ciclopirox (Penlac) 8 % solution Apply topically once daily at bedtime.      docusate sodium (Colace) 100 mg capsule Take 1 capsule (100 mg) by mouth 2 times a day. Hold for loose stools 30 capsule 0    ferrous sulfate, 325 mg ferrous sulfate, tablet Take 1 tablet by mouth once daily. 90 tablet 1    FreeStyle Lancets 28 gauge TEST ONCE DAILY 100  each 3    Jardiance 25 mg Take 1 tablet (25 mg) by mouth once daily. 100 tablet 1    metFORMIN (Glucophage) 1,000 mg tablet Take 1 tablet (1,000 mg) by mouth 2 times a day. 200 tablet 1    phenazopyridine (Pyridium) 100 mg tablet Take 1 tablet (100 mg) by mouth 3 times a day as needed (burning). 15 tablet 0    SITagliptin phosphate (Januvia) 100 mg tablet Take 1 tablet (100 mg) by mouth once daily. 100 tablet 1    Synthroid 150 mcg tablet Take 1 tablet (150 mcg) by mouth once daily. 90 tablet 3    tadalafil (Cialis) 5 mg tablet Take 1 tablet (5 mg) by mouth once daily. 30 tablet 11     No current facility-administered medications for this visit.   [2]   Patient Active Problem List  Diagnosis    Arteriosclerotic coronary artery disease    History of thyroid cancer    Essential hypertension    Hypothyroidism    Iron deficiency anemia    S/P CABG x 3    Type 2 diabetes mellitus with diabetic microalbuminuria, without long-term current use of insulin (Multi)    Athscl heart disease of native coronary artery w/o ang pctrs    BPH with obstruction/lower urinary tract symptoms    Hyperlipidemia    Urinary retention   [3]   Current Outpatient Medications   Medication Sig Dispense Refill    acetaminophen (Tylenol) 325 mg tablet Take by mouth every 6 hours if needed for mild pain (1 - 3).      aspirin 81 mg chewable tablet Chew 1 tablet (81 mg) once daily.      atorvastatin (Lipitor) 80 mg tablet TAKE 1 TABLET DAILY AT BEDTIME 90 tablet 3    blood sugar diagnostic (FreeStyle Lite Strips) strip TEST ONCE DAILY 100 strip 3    calcium carbonate-vitamin D3 600 mg-20 mcg (800 unit) tablet Take 2 tablets by mouth 2 times a day.      cholecalciferol (Vitamin D3) 25 MCG (1000 UT) tablet Take 1 tablet (1,000 Units) by mouth once daily. (Patient not taking: Reported on 11/11/2024)      ciclopirox (Penlac) 8 % solution Apply topically once daily at bedtime.      docusate sodium (Colace) 100 mg capsule Take 1 capsule (100 mg) by mouth 2  times a day. Hold for loose stools 30 capsule 0    ferrous sulfate, 325 mg ferrous sulfate, tablet Take 1 tablet by mouth once daily. 90 tablet 1    FreeStyle Lancets 28 gauge TEST ONCE DAILY 100 each 3    Jardiance 25 mg Take 1 tablet (25 mg) by mouth once daily. 100 tablet 1    metFORMIN (Glucophage) 1,000 mg tablet Take 1 tablet (1,000 mg) by mouth 2 times a day. 200 tablet 1    phenazopyridine (Pyridium) 100 mg tablet Take 1 tablet (100 mg) by mouth 3 times a day as needed (burning). 15 tablet 0    SITagliptin phosphate (Januvia) 100 mg tablet Take 1 tablet (100 mg) by mouth once daily. 100 tablet 1    Synthroid 150 mcg tablet Take 1 tablet (150 mcg) by mouth once daily. 90 tablet 3    tadalafil (Cialis) 5 mg tablet Take 1 tablet (5 mg) by mouth once daily. 30 tablet 11     No current facility-administered medications for this visit.   [4]   Past Medical History:  Diagnosis Date    Anemia     15.4/46.4 1/5    Atherosclerotic heart disease of native coronary artery with unspecified angina pectoris 06/23/2021    Coronary artery disease involving native coronary artery of native heart with angina pectoris    BPH (benign prostatic hyperplasia)     Disease of thyroid gland     thyroid cancer    GERD (gastroesophageal reflux disease)     Hyperlipidemia     Hypertension     Hypocalcemia 12/16/2019    Hypocalcemia/ s/p parathyroidectomy of one nodule    Type 2 diabetes mellitus     a1c 6.6 7/18   [5]   Past Surgical History:  Procedure Laterality Date    OTHER SURGICAL HISTORY  05/11/2018    Parathyroid Surgery    OTHER SURGICAL HISTORY  12/29/2019    Coronary artery bypass graft/ cardiologist dr. young 11/16/23 f/u 1 year    PROSTATE SURGERY      Laser    TOTAL THYROIDECTOMY  10/22/2013    Thyroid Surgery Total Thyroidectomy   [6]   Family History  Problem Relation Name Age of Onset    Dementia Mother      Alcohol abuse Father      Nephrolithiasis Brother      No Known Problems Half-Brother     [7]   Social  History  Tobacco Use    Smoking status: Former     Current packs/day: 0.00     Average packs/day: 0.5 packs/day for 14.0 years (7.0 ttl pk-yrs)     Types: Cigarettes     Start date:      Quit date:      Years since quittin.4    Smokeless tobacco: Never   Substance Use Topics    Alcohol use: Not Currently     Alcohol/week: 2.0 standard drinks of alcohol     Types: 2 Standard drinks or equivalent per week    Drug use: Never   [8]   Allergies  Allergen Reactions    Lisinopril Cough

## 2025-05-22 ENCOUNTER — TELEMEDICINE (OUTPATIENT)
Dept: UROLOGY | Facility: HOSPITAL | Age: 75
End: 2025-05-22
Payer: MEDICARE

## 2025-05-22 DIAGNOSIS — N52.9 ERECTILE DISORDER: ICD-10-CM

## 2025-05-22 DIAGNOSIS — N40.1 BPH WITH OBSTRUCTION/LOWER URINARY TRACT SYMPTOMS: Primary | ICD-10-CM

## 2025-05-22 DIAGNOSIS — N13.8 BPH WITH OBSTRUCTION/LOWER URINARY TRACT SYMPTOMS: Primary | ICD-10-CM

## 2025-05-22 PROCEDURE — 99213 OFFICE O/P EST LOW 20 MIN: CPT | Performed by: UROLOGY

## 2025-05-22 PROCEDURE — G2211 COMPLEX E/M VISIT ADD ON: HCPCS | Performed by: UROLOGY

## 2025-07-15 ENCOUNTER — APPOINTMENT (OUTPATIENT)
Dept: PRIMARY CARE | Facility: CLINIC | Age: 75
End: 2025-07-15
Payer: MEDICARE

## 2025-07-15 VITALS
OXYGEN SATURATION: 97 % | SYSTOLIC BLOOD PRESSURE: 125 MMHG | HEIGHT: 72 IN | BODY MASS INDEX: 27.36 KG/M2 | HEART RATE: 67 BPM | DIASTOLIC BLOOD PRESSURE: 71 MMHG | WEIGHT: 202 LBS | RESPIRATION RATE: 16 BRPM

## 2025-07-15 DIAGNOSIS — R80.9 TYPE 2 DIABETES MELLITUS WITH DIABETIC MICROALBUMINURIA, WITHOUT LONG-TERM CURRENT USE OF INSULIN (MULTI): ICD-10-CM

## 2025-07-15 DIAGNOSIS — E11.29 TYPE 2 DIABETES MELLITUS WITH DIABETIC MICROALBUMINURIA, WITHOUT LONG-TERM CURRENT USE OF INSULIN (MULTI): ICD-10-CM

## 2025-07-15 PROCEDURE — G2211 COMPLEX E/M VISIT ADD ON: HCPCS | Performed by: FAMILY MEDICINE

## 2025-07-15 PROCEDURE — 1159F MED LIST DOCD IN RCRD: CPT | Performed by: FAMILY MEDICINE

## 2025-07-15 PROCEDURE — 1160F RVW MEDS BY RX/DR IN RCRD: CPT | Performed by: FAMILY MEDICINE

## 2025-07-15 PROCEDURE — 1036F TOBACCO NON-USER: CPT | Performed by: FAMILY MEDICINE

## 2025-07-15 PROCEDURE — 3074F SYST BP LT 130 MM HG: CPT | Performed by: FAMILY MEDICINE

## 2025-07-15 PROCEDURE — 99214 OFFICE O/P EST MOD 30 MIN: CPT | Performed by: FAMILY MEDICINE

## 2025-07-15 PROCEDURE — 3078F DIAST BP <80 MM HG: CPT | Performed by: FAMILY MEDICINE

## 2025-07-15 RX ORDER — LANCETS 28 GAUGE
EACH MISCELLANEOUS
Qty: 100 EACH | Refills: 3 | Status: SHIPPED | OUTPATIENT
Start: 2025-07-15

## 2025-07-15 RX ORDER — BLOOD-GLUCOSE METER
KIT MISCELLANEOUS
Qty: 100 STRIP | Refills: 3 | Status: SHIPPED | OUTPATIENT
Start: 2025-07-15

## 2025-07-15 NOTE — PROGRESS NOTES
Subjective   Patient ID: José Miguel Lua is a 74 y.o. male who presents for Med Refill.    HPI  Has DM.  Condition(s) stable.  Taking med(s) as directed.  Requests refills of meds and DM supplies.    Review of Systems  No other complaints.     Objective   /71   Pulse 67   Resp 16   Ht 1.829 m (6')   Wt 91.6 kg (202 lb)   SpO2 97%   BMI 27.40 kg/m²     Physical Exam  Constitutional:       General: He is not in acute distress.     Appearance: He is overweight.   Cardiovascular:      Rate and Rhythm: Normal rate and regular rhythm.      Heart sounds: Normal heart sounds. No murmur heard.     No friction rub. No gallop.   Pulmonary:      Effort: Pulmonary effort is normal.      Breath sounds: Normal breath sounds. No wheezing, rhonchi or rales.   Neurological:      Mental Status: He is oriented to person, place, and time.   Psychiatric:         Mood and Affect: Mood normal.         Behavior: Behavior normal.     Assessment/Plan   Diagnoses and all orders for this visit:  Type 2 diabetes mellitus with diabetic microalbuminuria, without long-term current use of insulin (Multi)  -     Stable based on labs.  Continue established Tx plan.  F/U plans documented in the encounter note.    -     Basic Metabolic Panel; Future  -     Hemoglobin A1C; Future  -     Albumin-Creatinine Ratio, Urine Random; Future  -     blood sugar diagnostic (FreeStyle Lite Strips); TEST ONCE DAILY  -     FreeStyle Lancets 28 gauge; TEST ONCE DAILY    Nonfasting labs.  Refilled lancets and test strips.  Will refill meds after reviewing lab results.     F/U 6 months: Annual wellness visit.

## 2025-07-15 NOTE — PATIENT INSTRUCTIONS
Nonfasting labs.  Refilled lancets and test strips.  Will refill meds after reviewing lab results.     F/U 6 months: Annual wellness visit.    Lab services: [address]  Hours: M-F 7:15a-6:00p  Phone: [phone], Option 1

## 2025-07-16 LAB
ALBUMIN/CREAT UR: 9 MG/G CREAT
ANION GAP SERPL CALCULATED.4IONS-SCNC: 10 MMOL/L (CALC) (ref 7–17)
BUN SERPL-MCNC: 17 MG/DL (ref 7–25)
BUN/CREAT SERPL: 26 (CALC) (ref 6–22)
CALCIUM SERPL-MCNC: 8.5 MG/DL (ref 8.6–10.3)
CHLORIDE SERPL-SCNC: 103 MMOL/L (ref 98–110)
CO2 SERPL-SCNC: 26 MMOL/L (ref 20–32)
CREAT SERPL-MCNC: 0.65 MG/DL (ref 0.7–1.28)
CREAT UR-MCNC: 55 MG/DL (ref 20–320)
EGFRCR SERPLBLD CKD-EPI 2021: 98 ML/MIN/1.73M2
EST. AVERAGE GLUCOSE BLD GHB EST-MCNC: 151 MG/DL
EST. AVERAGE GLUCOSE BLD GHB EST-SCNC: 8.4 MMOL/L
GLUCOSE SERPL-MCNC: 130 MG/DL (ref 65–99)
HBA1C MFR BLD: 6.9 %
MICROALBUMIN UR-MCNC: 0.5 MG/DL
POTASSIUM SERPL-SCNC: 4.3 MMOL/L (ref 3.5–5.3)
SODIUM SERPL-SCNC: 139 MMOL/L (ref 135–146)

## 2025-07-20 DIAGNOSIS — E11.9 TYPE 2 DIABETES MELLITUS WITHOUT COMPLICATION, WITHOUT LONG-TERM CURRENT USE OF INSULIN: ICD-10-CM

## 2025-07-20 RX ORDER — EMPAGLIFLOZIN 25 MG/1
25 TABLET, FILM COATED ORAL DAILY
Qty: 100 TABLET | Refills: 1 | Status: SHIPPED | OUTPATIENT
Start: 2025-07-20

## 2025-07-20 RX ORDER — METFORMIN HYDROCHLORIDE 1000 MG/1
1000 TABLET ORAL 2 TIMES DAILY
Qty: 200 TABLET | Refills: 1 | Status: SHIPPED | OUTPATIENT
Start: 2025-07-20

## 2025-09-12 ENCOUNTER — APPOINTMENT (OUTPATIENT)
Dept: ENDOCRINOLOGY | Facility: CLINIC | Age: 75
End: 2025-09-12
Payer: MEDICARE

## 2026-01-15 ENCOUNTER — APPOINTMENT (OUTPATIENT)
Dept: PRIMARY CARE | Facility: CLINIC | Age: 76
End: 2026-01-15
Payer: MEDICARE

## (undated) DEVICE — CATHETER, URETHRAL, FOLEY, 3 WAY, BARDEX IC, 22 FR, 30 CC, SILVER LATEX

## (undated) DEVICE — CATHETER, LASER URETERAL, 7.1FR, 40CM

## (undated) DEVICE — BLADE, ROTATION MORCELLATOR, 4.8MM X 385MM, PIRHANA, DISPOSABLE

## (undated) DEVICE — PLUG, CATHETER

## (undated) DEVICE — Device

## (undated) DEVICE — BAG, DRAINAGE, ANTI-REFLUX CHAMBER, 2000ML

## (undated) DEVICE — SYRINGE, 50 CC, LUER LOCK

## (undated) DEVICE — TUBING, MORCELLATOR PUMP, DISPOSABLE

## (undated) DEVICE — EVACUATOR, UROVAC

## (undated) DEVICE — IRRIGATION SET, CYSTOSCOPY, TURP, Y, CONTINUOUS, 81 IN

## (undated) DEVICE — COLLECTION BAG, FLUID, NON-STERILE

## (undated) DEVICE — SYRINGE, 60 CC, IRRIGATION, PISTON, CATH TIP, W/LUER ADAPTER,DISP

## (undated) DEVICE — TUBING, SUCTION, CONNECTING, STERILE 0.25 X 120 IN., LF

## (undated) DEVICE — TOWEL, SURGICAL, NEURO, O/R, 16 X 26, BLUE, STERILE